# Patient Record
Sex: MALE | Race: WHITE | NOT HISPANIC OR LATINO | Employment: OTHER | URBAN - METROPOLITAN AREA
[De-identification: names, ages, dates, MRNs, and addresses within clinical notes are randomized per-mention and may not be internally consistent; named-entity substitution may affect disease eponyms.]

---

## 2017-03-15 ENCOUNTER — OFFICE VISIT (OUTPATIENT)
Dept: FAMILY MEDICINE CLINIC | Facility: CLINIC | Age: 82
End: 2017-03-15

## 2017-03-15 VITALS
TEMPERATURE: 98.1 F | SYSTOLIC BLOOD PRESSURE: 130 MMHG | OXYGEN SATURATION: 98 % | HEART RATE: 58 BPM | RESPIRATION RATE: 16 BRPM | HEIGHT: 64 IN | WEIGHT: 144 LBS | BODY MASS INDEX: 24.59 KG/M2 | DIASTOLIC BLOOD PRESSURE: 54 MMHG

## 2017-03-15 DIAGNOSIS — H92.01 OTALGIA, RIGHT: Primary | ICD-10-CM

## 2017-03-15 DIAGNOSIS — R58 ECCHYMOSIS: ICD-10-CM

## 2017-03-15 DIAGNOSIS — R05.9 COUGH: ICD-10-CM

## 2017-03-15 DIAGNOSIS — J06.9 ACUTE URI: ICD-10-CM

## 2017-03-15 DIAGNOSIS — D64.9 LOW HEMOGLOBIN: ICD-10-CM

## 2017-03-15 LAB
ERYTHROCYTE [DISTWIDTH] IN BLOOD BY AUTOMATED COUNT: 13 % (ref 4.5–15)
HCT VFR BLD AUTO: 38.5 % (ref 35–60)
HGB BLD-MCNC: 12.6 G/DL (ref 13.5–18)
LYMPHOCYTES # BLD AUTO: 1.5 10*3/MM3 (ref 1.2–3.4)
LYMPHOCYTES NFR BLD AUTO: 18.9 % (ref 21–51)
MCH RBC QN AUTO: 30.5 PG (ref 26.1–33.1)
MCHC RBC AUTO-ENTMCNC: 32.8 G/DL (ref 33–37)
MCV RBC AUTO: 93 FL (ref 80–99)
MONOCYTES # BLD AUTO: 0.5 10*3/MM3 (ref 0.1–0.6)
MONOCYTES NFR BLD AUTO: 7 % (ref 2–9)
NEUTROPHILS # BLD AUTO: 5.7 10*3/MM3 (ref 1.4–6.5)
NEUTROPHILS NFR BLD AUTO: 74.1 % (ref 42–75)
PLATELET # BLD AUTO: 216 10*3/MM3 (ref 150–450)
PMV BLD AUTO: 7.7 FL (ref 7.1–10.5)
RBC # BLD AUTO: 4.13 10*6/MM3 (ref 4–6)
WBC NRBC COR # BLD: 7.7 10*3/MM3 (ref 4.5–10)

## 2017-03-15 PROCEDURE — 71020 XR CHEST 2 VW: CPT | Performed by: NURSE PRACTITIONER

## 2017-03-15 PROCEDURE — 36415 COLL VENOUS BLD VENIPUNCTURE: CPT | Performed by: NURSE PRACTITIONER

## 2017-03-15 PROCEDURE — 99213 OFFICE O/P EST LOW 20 MIN: CPT | Performed by: NURSE PRACTITIONER

## 2017-03-15 PROCEDURE — 85025 COMPLETE CBC W/AUTO DIFF WBC: CPT | Performed by: NURSE PRACTITIONER

## 2017-03-15 RX ORDER — FLUTICASONE PROPIONATE 50 MCG
2 SPRAY, SUSPENSION (ML) NASAL DAILY
Qty: 1 BOTTLE | Refills: 12 | Status: SHIPPED | OUTPATIENT
Start: 2017-03-15 | End: 2018-05-18

## 2017-03-15 RX ORDER — AMOXICILLIN 875 MG/1
875 TABLET, COATED ORAL 2 TIMES DAILY
Qty: 14 TABLET | Refills: 0 | Status: SHIPPED | OUTPATIENT
Start: 2017-03-15 | End: 2017-03-22

## 2017-03-15 NOTE — PROGRESS NOTES
Subjective   Praveen Torres is a 81 y.o. male.     History of Present Illness   C/o cough, congestion, ear drainage, started about 10 days ago, he denies fever, he has right ear pressure and drainage. He denies chills and sweats. He states he got better, then started coughing again. He states he tried nyquil, which usually helps him but it has not yet. He has a slight sore throat. He has a productive cough, grayish sputum. He denies seasonal allergies. He denies sweats and chills. He states he is UTD on his pneumonia vaccine.   He states he has a bruise on his chest, he states when he coughs he is sore and had pain, he states the bruise is yellow, he noticed it about 3 days ago. He states it is painful when he coughs. He denies and fall or injury.    The following portions of the patient's history were reviewed and updated as appropriate: allergies, current medications, past family history, past medical history, past social history, past surgical history and problem list.    Review of Systems   Constitutional: Negative for chills and fever.   HENT: Positive for congestion, ear discharge (drainage) and ear pain. Negative for postnasal drip, rhinorrhea and sore throat.    Respiratory: Positive for cough. Negative for chest tightness, shortness of breath and wheezing.    Cardiovascular: Negative for chest pain.   Musculoskeletal: Positive for myalgias. Negative for arthralgias.   Skin: Positive for color change. Negative for rash.   Allergic/Immunologic: Positive for environmental allergies.   Neurological: Negative for dizziness, light-headedness and headaches.       Objective   Physical Exam   Constitutional: He is oriented to person, place, and time. He appears well-developed and well-nourished.   HENT:   Head: Normocephalic.   Right Ear: Ear canal normal. A middle ear effusion is present.   Left Ear: Tympanic membrane and ear canal normal.   Nose: Nose normal.   Mouth/Throat: Uvula is midline, oropharynx is clear  and moist and mucous membranes are normal.   Eyes: Pupils are equal, round, and reactive to light.   Neck: Neck supple.   Cardiovascular: Normal rate, regular rhythm and normal heart sounds.    Pulmonary/Chest: Effort normal and breath sounds normal. No respiratory distress. He has no decreased breath sounds. He has no wheezes. He has no rhonchi. He exhibits tenderness. He exhibits no laceration, no edema, no deformity and no swelling.       Musculoskeletal: He exhibits tenderness.   Lymphadenopathy:     He has no cervical adenopathy.   Neurological: He is alert and oriented to person, place, and time.   Skin: Skin is warm and dry.   Psychiatric: He has a normal mood and affect. His behavior is normal. Judgment and thought content normal.   Nursing note and vitals reviewed.    cxr 2v in office, for cough and bruising on sternum, no comparison noted, shows NAD, awaiting radiology over read.     Assessment/Plan   Praveen was seen today for cough.    Diagnoses and all orders for this visit:    Otalgia, right    Acute URI    Cough  -     XR Chest 2 View  -     CBC & Differential  -     CBC Auto Differential    Ecchymosis  -     CBC & Differential  -     CBC Auto Differential    Low hemoglobin  -     Occult Blood X 1, Stool    Other orders  -     amoxicillin (AMOXIL) 875 MG tablet; Take 1 tablet by mouth 2 (Two) Times a Day for 7 days.  -     fluticasone (FLONASE) 50 MCG/ACT nasal spray; 2 sprays into each nostril Daily.        CBC today, low hgb 12.7. Platelets WNL.   Occult stool x1, patient to return to office.  CXR today, will call with results.  Start amoxicillin 875mg 2x day for 7 days, take with food.  Start flonase 2 sprays each nostril daily.   May try mucinex OTC as needed for cough.   Increase fluid intake, get plenty of rest.   Patient agrees with plan of care and understands instructions. Call if worsening symptoms or any problems or concerns.

## 2017-03-15 NOTE — PATIENT INSTRUCTIONS
CBC today, low hgb 12.7. Platelets WNL.   Occult stool x1, patient to return to office.  CXR today, will call with results.  Start amoxicillin 875mg 2x day for 7 days, take with food.  Start flonase 2 sprays each nostril daily.   May try mucinex OTC as needed for cough.   Increase fluid intake, get plenty of rest.   Patient agrees with plan of care and understands instructions. Call if worsening symptoms or any problems or concerns.

## 2017-03-16 ENCOUNTER — TELEPHONE (OUTPATIENT)
Dept: FAMILY MEDICINE CLINIC | Facility: CLINIC | Age: 82
End: 2017-03-16

## 2017-03-16 DIAGNOSIS — R58 ECCHYMOSIS: Primary | ICD-10-CM

## 2017-03-17 ENCOUNTER — TELEPHONE (OUTPATIENT)
Dept: FAMILY MEDICINE CLINIC | Facility: CLINIC | Age: 82
End: 2017-03-17

## 2017-03-17 LAB — GASTROCULT GAST QL: NEGATIVE

## 2017-03-17 PROCEDURE — 82274 ASSAY TEST FOR BLOOD FECAL: CPT | Performed by: NURSE PRACTITIONER

## 2017-03-17 NOTE — TELEPHONE ENCOUNTER
----- Message from ROXANNE Maldonado sent at 3/17/2017 10:30 AM EDT -----  Please call patient with results.  His occult blood was negative. Increase fluids, may try MV OTC.    Pt informed of lab results

## 2017-03-20 ENCOUNTER — CLINICAL SUPPORT (OUTPATIENT)
Dept: FAMILY MEDICINE CLINIC | Facility: CLINIC | Age: 82
End: 2017-03-20

## 2017-03-20 DIAGNOSIS — R05.9 COUGH: Primary | ICD-10-CM

## 2017-03-20 PROCEDURE — 71110 X-RAY EXAM RIBS BIL 3 VIEWS: CPT | Performed by: NURSE PRACTITIONER

## 2017-03-22 ENCOUNTER — TELEPHONE (OUTPATIENT)
Dept: FAMILY MEDICINE CLINIC | Facility: CLINIC | Age: 82
End: 2017-03-22

## 2017-03-22 NOTE — TELEPHONE ENCOUNTER
----- Message from ROXANNE Maldonado sent at 3/22/2017  8:42 AM EDT -----  Please call patient with results.  His rib xray is normal, no fractures, heart is normal size, he should f/u as needed or if bruising does not resolve.    Pt informed of xray results

## 2017-03-23 NOTE — PROGRESS NOTES
bilat rib 3v xray in office, no comparison, for chest bruising and cough, shows NAD, no fracture per radiologist report.

## 2017-04-10 RX ORDER — SIMVASTATIN 10 MG
TABLET ORAL
Qty: 90 TABLET | Refills: 2 | Status: SHIPPED | OUTPATIENT
Start: 2017-04-10 | End: 2018-01-18 | Stop reason: SDUPTHER

## 2017-04-10 RX ORDER — LISINOPRIL 10 MG/1
TABLET ORAL
Qty: 90 TABLET | Refills: 2 | Status: SHIPPED | OUTPATIENT
Start: 2017-04-10 | End: 2018-01-18 | Stop reason: SDUPTHER

## 2017-04-20 RX ORDER — CYANOCOBALAMIN 1000 UG/ML
INJECTION, SOLUTION INTRAMUSCULAR; SUBCUTANEOUS
Qty: 3 ML | Refills: 2 | Status: SHIPPED | OUTPATIENT
Start: 2017-04-20 | End: 2018-01-18 | Stop reason: SDUPTHER

## 2017-04-27 ENCOUNTER — LAB (OUTPATIENT)
Dept: FAMILY MEDICINE CLINIC | Facility: CLINIC | Age: 82
End: 2017-04-27

## 2017-04-27 DIAGNOSIS — E78.5 HYPERLIPIDEMIA, UNSPECIFIED HYPERLIPIDEMIA TYPE: Primary | ICD-10-CM

## 2017-04-27 LAB
ALBUMIN SERPL-MCNC: 4.4 G/DL (ref 3.5–5.2)
ALBUMIN/GLOB SERPL: 1.5 G/DL
ALP SERPL-CCNC: 66 U/L (ref 39–117)
ALT SERPL W P-5'-P-CCNC: 18 U/L (ref 1–41)
ANION GAP SERPL CALCULATED.3IONS-SCNC: 9.8 MMOL/L
AST SERPL-CCNC: 23 U/L (ref 1–40)
BILIRUB SERPL-MCNC: 0.6 MG/DL (ref 0.1–1.2)
BUN BLD-MCNC: 13 MG/DL (ref 8–23)
BUN/CREAT SERPL: 10.7 (ref 7–25)
CALCIUM SPEC-SCNC: 9.7 MG/DL (ref 8.6–10.5)
CHLORIDE SERPL-SCNC: 99 MMOL/L (ref 98–107)
CHOLEST SERPL-MCNC: 154 MG/DL (ref 0–200)
CO2 SERPL-SCNC: 31.2 MMOL/L (ref 22–29)
CREAT BLD-MCNC: 1.22 MG/DL (ref 0.76–1.27)
GFR SERPL CREATININE-BSD FRML MDRD: 57 ML/MIN/1.73
GLOBULIN UR ELPH-MCNC: 2.9 GM/DL
GLUCOSE BLD-MCNC: 92 MG/DL (ref 65–99)
HDLC SERPL-MCNC: 61 MG/DL (ref 40–60)
LDLC SERPL CALC-MCNC: 78 MG/DL (ref 0–100)
LDLC/HDLC SERPL: 1.28 {RATIO}
POTASSIUM BLD-SCNC: 4.7 MMOL/L (ref 3.5–5.2)
PROT SERPL-MCNC: 7.3 G/DL (ref 6–8.5)
SODIUM BLD-SCNC: 140 MMOL/L (ref 136–145)
TRIGL SERPL-MCNC: 76 MG/DL (ref 0–150)
VLDLC SERPL-MCNC: 15.2 MG/DL (ref 5–40)

## 2017-04-27 PROCEDURE — 80061 LIPID PANEL: CPT | Performed by: INTERNAL MEDICINE

## 2017-04-27 PROCEDURE — 80053 COMPREHEN METABOLIC PANEL: CPT | Performed by: INTERNAL MEDICINE

## 2017-04-27 RX ORDER — ESCITALOPRAM OXALATE 10 MG/1
TABLET ORAL
Qty: 90 TABLET | Refills: 2 | Status: SHIPPED | OUTPATIENT
Start: 2017-04-27 | End: 2018-01-25 | Stop reason: SDUPTHER

## 2017-05-04 ENCOUNTER — OFFICE VISIT (OUTPATIENT)
Dept: FAMILY MEDICINE CLINIC | Facility: CLINIC | Age: 82
End: 2017-05-04

## 2017-05-04 VITALS
HEART RATE: 65 BPM | SYSTOLIC BLOOD PRESSURE: 138 MMHG | TEMPERATURE: 97.7 F | OXYGEN SATURATION: 97 % | WEIGHT: 149.6 LBS | HEIGHT: 64 IN | BODY MASS INDEX: 25.54 KG/M2 | DIASTOLIC BLOOD PRESSURE: 58 MMHG

## 2017-05-04 DIAGNOSIS — R79.89 ABNORMAL CBC: Primary | ICD-10-CM

## 2017-05-04 DIAGNOSIS — Z00.00 MEDICARE ANNUAL WELLNESS VISIT, INITIAL: Primary | ICD-10-CM

## 2017-05-04 DIAGNOSIS — E78.49 OTHER HYPERLIPIDEMIA: ICD-10-CM

## 2017-05-04 DIAGNOSIS — E53.8 B12 DEFICIENCY: ICD-10-CM

## 2017-05-04 DIAGNOSIS — I10 HYPERTENSION, ESSENTIAL: ICD-10-CM

## 2017-05-04 DIAGNOSIS — D51.8 OTHER VITAMIN B12 DEFICIENCY ANEMIA: ICD-10-CM

## 2017-05-04 DIAGNOSIS — G47.00 INSOMNIA, UNSPECIFIED TYPE: ICD-10-CM

## 2017-05-04 DIAGNOSIS — J30.1 SEASONAL ALLERGIC RHINITIS DUE TO POLLEN: ICD-10-CM

## 2017-05-04 LAB
ERYTHROCYTE [DISTWIDTH] IN BLOOD BY AUTOMATED COUNT: 13.4 % (ref 4.5–15)
FERRITIN SERPL-MCNC: 64.95 NG/ML (ref 30–400)
FOLATE SERPL-MCNC: >20 NG/ML (ref 4.78–24.2)
HCT VFR BLD AUTO: 38.8 % (ref 35–60)
HGB BLD-MCNC: 13.1 G/DL (ref 13.5–18)
IRON 24H UR-MRATE: 73 MCG/DL (ref 59–158)
IRON SATN MFR SERPL: 20 % (ref 20–50)
LYMPHOCYTES # BLD AUTO: 1.7 10*3/MM3 (ref 1.2–3.4)
LYMPHOCYTES NFR BLD AUTO: 26.1 % (ref 21–51)
MCH RBC QN AUTO: 31.5 PG (ref 26.1–33.1)
MCHC RBC AUTO-ENTMCNC: 33.7 G/DL (ref 33–37)
MCV RBC AUTO: 93.5 FL (ref 80–99)
MONOCYTES # BLD AUTO: 0.4 10*3/MM3 (ref 0.1–0.6)
MONOCYTES NFR BLD AUTO: 6.4 % (ref 2–9)
NEUTROPHILS # BLD AUTO: 4.3 10*3/MM3 (ref 1.4–6.5)
NEUTROPHILS NFR BLD AUTO: 67.5 % (ref 42–75)
PLATELET # BLD AUTO: 141 10*3/MM3 (ref 150–450)
PMV BLD AUTO: 7.7 FL (ref 7.1–10.5)
RBC # BLD AUTO: 4.15 10*6/MM3 (ref 4–6)
TIBC SERPL-MCNC: 364 MCG/DL (ref 298–536)
TRANSFERRIN SERPL-MCNC: 244 MG/DL (ref 200–360)
VIT B12 BLD-MCNC: 1034 PG/ML (ref 211–946)
WBC NRBC COR # BLD: 6.4 10*3/MM3 (ref 4.5–10)

## 2017-05-04 PROCEDURE — 90670 PCV13 VACCINE IM: CPT | Performed by: INTERNAL MEDICINE

## 2017-05-04 PROCEDURE — 82728 ASSAY OF FERRITIN: CPT | Performed by: INTERNAL MEDICINE

## 2017-05-04 PROCEDURE — 84466 ASSAY OF TRANSFERRIN: CPT | Performed by: INTERNAL MEDICINE

## 2017-05-04 PROCEDURE — 82607 VITAMIN B-12: CPT | Performed by: INTERNAL MEDICINE

## 2017-05-04 PROCEDURE — 99213 OFFICE O/P EST LOW 20 MIN: CPT | Performed by: INTERNAL MEDICINE

## 2017-05-04 PROCEDURE — 85025 COMPLETE CBC W/AUTO DIFF WBC: CPT | Performed by: INTERNAL MEDICINE

## 2017-05-04 PROCEDURE — G0439 PPPS, SUBSEQ VISIT: HCPCS | Performed by: INTERNAL MEDICINE

## 2017-05-04 PROCEDURE — G0009 ADMIN PNEUMOCOCCAL VACCINE: HCPCS | Performed by: INTERNAL MEDICINE

## 2017-05-04 PROCEDURE — 83540 ASSAY OF IRON: CPT | Performed by: INTERNAL MEDICINE

## 2017-05-04 PROCEDURE — 82746 ASSAY OF FOLIC ACID SERUM: CPT | Performed by: INTERNAL MEDICINE

## 2017-05-04 RX ORDER — TRAZODONE HYDROCHLORIDE 50 MG/1
25 TABLET ORAL NIGHTLY
Qty: 90 TABLET | Refills: 3 | Status: SHIPPED | OUTPATIENT
Start: 2017-05-04 | End: 2018-05-18 | Stop reason: SDUPTHER

## 2017-07-29 RX ORDER — TRAZODONE HYDROCHLORIDE 50 MG/1
TABLET ORAL
Qty: 90 TABLET | Refills: 2 | Status: SHIPPED | OUTPATIENT
Start: 2017-07-29 | End: 2018-05-18 | Stop reason: SDUPTHER

## 2018-01-19 RX ORDER — CYANOCOBALAMIN 1000 UG/ML
INJECTION, SOLUTION INTRAMUSCULAR; SUBCUTANEOUS
Qty: 3 ML | Refills: 2 | Status: SHIPPED | OUTPATIENT
Start: 2018-01-19 | End: 2018-05-18 | Stop reason: SDUPTHER

## 2018-01-19 RX ORDER — SIMVASTATIN 10 MG
TABLET ORAL
Qty: 90 TABLET | Refills: 2 | Status: SHIPPED | OUTPATIENT
Start: 2018-01-19 | End: 2018-05-18 | Stop reason: SDUPTHER

## 2018-01-19 RX ORDER — LISINOPRIL 10 MG/1
TABLET ORAL
Qty: 90 TABLET | Refills: 2 | Status: SHIPPED | OUTPATIENT
Start: 2018-01-19 | End: 2018-05-18 | Stop reason: SDUPTHER

## 2018-01-25 RX ORDER — ESCITALOPRAM OXALATE 10 MG/1
TABLET ORAL
Qty: 90 TABLET | Refills: 2 | Status: SHIPPED | OUTPATIENT
Start: 2018-01-25 | End: 2018-05-18 | Stop reason: SDUPTHER

## 2018-05-18 ENCOUNTER — OFFICE VISIT (OUTPATIENT)
Dept: INTERNAL MEDICINE | Facility: CLINIC | Age: 83
End: 2018-05-18

## 2018-05-18 VITALS
OXYGEN SATURATION: 99 % | HEIGHT: 64 IN | HEART RATE: 57 BPM | WEIGHT: 147.2 LBS | RESPIRATION RATE: 16 BRPM | BODY MASS INDEX: 25.13 KG/M2 | SYSTOLIC BLOOD PRESSURE: 129 MMHG | DIASTOLIC BLOOD PRESSURE: 78 MMHG

## 2018-05-18 DIAGNOSIS — E78.2 MIXED HYPERLIPIDEMIA: Primary | ICD-10-CM

## 2018-05-18 DIAGNOSIS — F51.01 PRIMARY INSOMNIA: ICD-10-CM

## 2018-05-18 DIAGNOSIS — D51.8 OTHER VITAMIN B12 DEFICIENCY ANEMIA: ICD-10-CM

## 2018-05-18 DIAGNOSIS — B00.9 HSV INFECTION: ICD-10-CM

## 2018-05-18 DIAGNOSIS — I10 ESSENTIAL HYPERTENSION: ICD-10-CM

## 2018-05-18 DIAGNOSIS — F41.9 ANXIETY: ICD-10-CM

## 2018-05-18 PROBLEM — D51.9 ANEMIA DUE TO VITAMIN B12 DEFICIENCY: Status: ACTIVE | Noted: 2018-05-18

## 2018-05-18 PROBLEM — E78.5 HYPERLIPIDEMIA: Status: ACTIVE | Noted: 2018-05-18

## 2018-05-18 PROCEDURE — 99214 OFFICE O/P EST MOD 30 MIN: CPT | Performed by: FAMILY MEDICINE

## 2018-05-18 RX ORDER — LISINOPRIL 10 MG/1
10 TABLET ORAL DAILY
Qty: 90 TABLET | Refills: 2 | Status: SHIPPED | OUTPATIENT
Start: 2018-05-18 | End: 2019-04-12 | Stop reason: SDUPTHER

## 2018-05-18 RX ORDER — SIMVASTATIN 10 MG
10 TABLET ORAL DAILY
Qty: 90 TABLET | Refills: 2 | Status: SHIPPED | OUTPATIENT
Start: 2018-05-18 | End: 2019-04-12 | Stop reason: SDUPTHER

## 2018-05-18 RX ORDER — CYANOCOBALAMIN 1000 UG/ML
1000 INJECTION, SOLUTION INTRAMUSCULAR; SUBCUTANEOUS
Qty: 3 ML | Refills: 2 | Status: SHIPPED | OUTPATIENT
Start: 2018-05-18 | End: 2019-03-11 | Stop reason: SDUPTHER

## 2018-05-18 RX ORDER — TRAZODONE HYDROCHLORIDE 50 MG/1
25 TABLET ORAL NIGHTLY
Qty: 90 TABLET | Refills: 3 | Status: SHIPPED | OUTPATIENT
Start: 2018-05-18 | End: 2019-05-20 | Stop reason: SDUPTHER

## 2018-05-18 RX ORDER — ESCITALOPRAM OXALATE 10 MG/1
10 TABLET ORAL DAILY
Qty: 90 TABLET | Refills: 2 | Status: SHIPPED | OUTPATIENT
Start: 2018-05-18 | End: 2019-05-11 | Stop reason: SDUPTHER

## 2018-05-18 NOTE — PROGRESS NOTES
Praveen Torres is a 83 y.o. male.      Assessment/Plan   Problem List Items Addressed This Visit        Cardiovascular and Mediastinum    Mixed hyperlipidemia - Primary    Relevant Medications    simvastatin (ZOCOR) 10 MG tablet    Other Relevant Orders    Lipid Panel    Essential hypertension    Relevant Medications    lisinopril (PRINIVIL,ZESTRIL) 10 MG tablet    Other Relevant Orders    Comprehensive Metabolic Panel       Digestive    Anemia due to vitamin B12 deficiency    Relevant Medications    cyanocobalamin 1000 MCG/ML injection    Other Relevant Orders    CBC & Differential    Methylmalonic Acid, Serum       Other    HSV infection    Anxiety    Primary insomnia          Recommend shingles vaccine through pharmacy  Follow  up results of blood work continue present management of chronic medical problems  Otherwise as needed or in 9 months    Chief Complaint   Patient presents with   • esatblish care for hypertension   • Establish Care     for cholesterol    • Establish Care     for anxiety     Social History   Substance Use Topics   • Smoking status: Never Smoker   • Smokeless tobacco: Never Used   • Alcohol use Not on file       History of Present Illness   Follow-up visit for chronic problems of hypertension hyperlipidemia vitamin D deficiency anxiety insomnia and intermittent HSV infection he says everything is well controlled with present treatment plan he will continue the same he has been 1 side effects of chest pain shortness breath increased fatigue  The following portions of the patient's history were reviewed and updated as appropriate:PMHroutine: Social history , Past Medical History, Surgical history , Allergies, Current Medications, Active Problem List, Family History and Health Maintenance    Review of Systems   Constitutional: Negative for activity change, appetite change and unexpected weight change.   HENT: Negative for nosebleeds and trouble swallowing.    Eyes: Negative for pain and visual  "disturbance.   Respiratory: Negative for chest tightness, shortness of breath and wheezing.    Cardiovascular: Negative for chest pain and palpitations.   Gastrointestinal: Negative for abdominal pain and blood in stool.   Endocrine: Negative.    Genitourinary: Negative for difficulty urinating and hematuria.   Musculoskeletal: Negative for joint swelling.   Skin: Negative for color change and rash.   Allergic/Immunologic: Negative.    Neurological: Negative for syncope and speech difficulty.   Hematological: Negative.  Negative for adenopathy.   Psychiatric/Behavioral: Negative for agitation and confusion.   All other systems reviewed and are negative.      Objective   Vitals:    05/18/18 1359   BP: 129/78   BP Location: Right arm   Patient Position: Sitting   Cuff Size: Adult   Pulse: 57   Resp: 16   SpO2: 99%   Weight: 66.8 kg (147 lb 3.2 oz)   Height: 162.6 cm (64\")     Body mass index is 25.27 kg/m².  Physical Exam   Constitutional: He is oriented to person, place, and time. He appears well-developed and well-nourished. No distress.   HENT:   Head: Normocephalic and atraumatic.   Eyes: Conjunctivae and EOM are normal. Pupils are equal, round, and reactive to light. Right eye exhibits no discharge. Left eye exhibits no discharge. No scleral icterus.   Neck: Normal range of motion. Neck supple. No tracheal deviation present. No thyromegaly present.   Cardiovascular: Normal rate, regular rhythm, normal heart sounds, intact distal pulses and normal pulses.  Exam reveals no gallop.    No murmur heard.  Pulmonary/Chest: Effort normal and breath sounds normal. No respiratory distress. He has no wheezes. He has no rales.   Abdominal: Soft. Bowel sounds are normal.   Musculoskeletal: Normal range of motion.   Neurological: He is alert and oriented to person, place, and time. He exhibits normal muscle tone. Coordination normal.   Skin: Skin is warm. No rash noted. No erythema. No pallor.   Psychiatric: He has a normal " mood and affect. His behavior is normal. Judgment and thought content normal.   Nursing note and vitals reviewed.    Reviewed Data:  No visits with results within 1 Month(s) from this visit.   Latest known visit with results is:   Office Visit on 05/04/2017   Component Date Value Ref Range Status   • Ferritin 05/04/2017 64.95  30.00 - 400.00 ng/mL Final   • Iron 05/04/2017 73  59 - 158 mcg/dL Final   • Iron Saturation 05/04/2017 20  20 - 50 % Final   • Transferrin 05/04/2017 244  200 - 360 mg/dL Final   • TIBC 05/04/2017 364  298 - 536 mcg/dL Final   • Folate 05/04/2017 >20.00  4.78 - 24.20 ng/mL Final   • Vitamin B-12 05/04/2017 1034* 211 - 946 pg/mL Final   • WBC 05/04/2017 6.40  4.50 - 10.00 10*3/mm3 Final   • RBC 05/04/2017 4.15  4.00 - 6.00 10*6/mm3 Final   • Hemoglobin 05/04/2017 13.1* 13.5 - 18.0 g/dL Final   • Hematocrit 05/04/2017 38.8  35.0 - 60.0 % Final   • RDW 05/04/2017 13.4  4.5 - 15.0 % Final   • MCV 05/04/2017 93.5  80.0 - 99.0 fL Final   • MCH 05/04/2017 31.5  26.1 - 33.1 pg Final   • MCHC 05/04/2017 33.7  33.0 - 37.0 g/dL Final   • MPV 05/04/2017 7.7  7.1 - 10.5 fL Final   • Platelets 05/04/2017 141* 150 - 450 10*3/mm3 Final   • Neutrophil % 05/04/2017 67.5  42.0 - 75.0 % Final   • Lymphocyte % 05/04/2017 26.1  21.0 - 51.0 % Final   • Monocyte % 05/04/2017 6.4  2.0 - 9.0 % Final   • Neutrophils, Absolute 05/04/2017 4.30  1.40 - 6.50 10*3/mm3 Final   • Lymphocytes, Absolute 05/04/2017 1.70  1.20 - 3.40 10*3/mm3 Final   • Monocytes, Absolute 05/04/2017 0.40  0.10 - 0.60 10*3/mm3 Final

## 2018-06-04 ENCOUNTER — TELEPHONE (OUTPATIENT)
Dept: INTERNAL MEDICINE | Facility: CLINIC | Age: 83
End: 2018-06-04

## 2018-06-04 LAB
ALBUMIN SERPL-MCNC: 4.3 G/DL (ref 3.5–5.2)
ALBUMIN/GLOB SERPL: 2 G/DL
ALP SERPL-CCNC: 66 U/L (ref 39–117)
ALT SERPL-CCNC: 18 U/L (ref 1–41)
AST SERPL-CCNC: 17 U/L (ref 1–40)
BASOPHILS # BLD AUTO: 0.02 10*3/MM3 (ref 0–0.2)
BASOPHILS NFR BLD AUTO: 0.4 % (ref 0–1.5)
BILIRUB SERPL-MCNC: 0.5 MG/DL (ref 0.1–1.2)
BUN SERPL-MCNC: 14 MG/DL (ref 8–23)
BUN/CREAT SERPL: 10.3 (ref 7–25)
CALCIUM SERPL-MCNC: 9.3 MG/DL (ref 8.6–10.5)
CHLORIDE SERPL-SCNC: 103 MMOL/L (ref 98–107)
CHOLEST SERPL-MCNC: 153 MG/DL (ref 0–200)
CO2 SERPL-SCNC: 30.7 MMOL/L (ref 22–29)
CREAT SERPL-MCNC: 1.36 MG/DL (ref 0.76–1.27)
EOSINOPHIL # BLD AUTO: 0.13 10*3/MM3 (ref 0–0.7)
EOSINOPHIL NFR BLD AUTO: 2.6 % (ref 0.3–6.2)
ERYTHROCYTE [DISTWIDTH] IN BLOOD BY AUTOMATED COUNT: 13.3 % (ref 11.5–14.5)
GFR SERPLBLD CREATININE-BSD FMLA CKD-EPI: 50 ML/MIN/1.73
GFR SERPLBLD CREATININE-BSD FMLA CKD-EPI: 61 ML/MIN/1.73
GLOBULIN SER CALC-MCNC: 2.2 GM/DL
GLUCOSE SERPL-MCNC: 96 MG/DL (ref 65–99)
HCT VFR BLD AUTO: 38.7 % (ref 40.4–52.2)
HDLC SERPL-MCNC: 57 MG/DL (ref 40–60)
HGB BLD-MCNC: 12.6 G/DL (ref 13.7–17.6)
IMM GRANULOCYTES # BLD: 0.01 10*3/MM3 (ref 0–0.03)
IMM GRANULOCYTES NFR BLD: 0.2 % (ref 0–0.5)
LDLC SERPL CALC-MCNC: 80 MG/DL (ref 0–100)
LYMPHOCYTES # BLD AUTO: 1.79 10*3/MM3 (ref 0.9–4.8)
LYMPHOCYTES NFR BLD AUTO: 36.2 % (ref 19.6–45.3)
MCH RBC QN AUTO: 31.2 PG (ref 27–32.7)
MCHC RBC AUTO-ENTMCNC: 32.6 G/DL (ref 32.6–36.4)
MCV RBC AUTO: 95.8 FL (ref 79.8–96.2)
METHYLMALONATE SERPL-SCNC: 143 NMOL/L (ref 0–378)
MONOCYTES # BLD AUTO: 0.54 10*3/MM3 (ref 0.2–1.2)
MONOCYTES NFR BLD AUTO: 10.9 % (ref 5–12)
NEUTROPHILS # BLD AUTO: 2.46 10*3/MM3 (ref 1.9–8.1)
NEUTROPHILS NFR BLD AUTO: 49.9 % (ref 42.7–76)
PLATELET # BLD AUTO: 143 10*3/MM3 (ref 140–500)
POTASSIUM SERPL-SCNC: 5.1 MMOL/L (ref 3.5–5.2)
PROT SERPL-MCNC: 6.5 G/DL (ref 6–8.5)
RBC # BLD AUTO: 4.04 10*6/MM3 (ref 4.6–6)
SODIUM SERPL-SCNC: 145 MMOL/L (ref 136–145)
TRIGL SERPL-MCNC: 80 MG/DL (ref 0–150)
VLDLC SERPL CALC-MCNC: 16 MG/DL (ref 5–40)
WBC # BLD AUTO: 4.94 10*3/MM3 (ref 4.5–10.7)

## 2018-06-04 NOTE — TELEPHONE ENCOUNTER
----- Message from Asa Grace MD sent at 6/4/2018  2:08 PM EDT -----  Labs ok recheck chemistry panel 6 months

## 2018-11-20 DIAGNOSIS — I10 ESSENTIAL HYPERTENSION: ICD-10-CM

## 2018-11-20 DIAGNOSIS — I10 ESSENTIAL HYPERTENSION: Primary | ICD-10-CM

## 2018-11-20 LAB
ALBUMIN SERPL-MCNC: 4.6 G/DL (ref 3.5–5.2)
ALBUMIN/GLOB SERPL: 1.9 G/DL
ALP SERPL-CCNC: 69 U/L (ref 39–117)
ALT SERPL-CCNC: 18 U/L (ref 1–41)
AST SERPL-CCNC: 19 U/L (ref 1–40)
BILIRUB SERPL-MCNC: 0.5 MG/DL (ref 0.1–1.2)
BUN SERPL-MCNC: 13 MG/DL (ref 8–23)
BUN/CREAT SERPL: 10.8 (ref 7–25)
CALCIUM SERPL-MCNC: 9.5 MG/DL (ref 8.6–10.5)
CHLORIDE SERPL-SCNC: 99 MMOL/L (ref 98–107)
CO2 SERPL-SCNC: 32.5 MMOL/L (ref 22–29)
CREAT SERPL-MCNC: 1.2 MG/DL (ref 0.76–1.27)
GLOBULIN SER CALC-MCNC: 2.4 GM/DL
GLUCOSE SERPL-MCNC: 106 MG/DL (ref 65–99)
POTASSIUM SERPL-SCNC: 4.8 MMOL/L (ref 3.5–5.2)
PROT SERPL-MCNC: 7 G/DL (ref 6–8.5)
SODIUM SERPL-SCNC: 140 MMOL/L (ref 136–145)

## 2018-11-28 ENCOUNTER — TELEPHONE (OUTPATIENT)
Dept: INTERNAL MEDICINE | Facility: CLINIC | Age: 83
End: 2018-11-28

## 2019-03-11 RX ORDER — CYANOCOBALAMIN 1000 UG/ML
INJECTION, SOLUTION INTRAMUSCULAR; SUBCUTANEOUS
Qty: 3 ML | Refills: 2 | Status: SHIPPED | OUTPATIENT
Start: 2019-03-11 | End: 2019-11-18 | Stop reason: SDUPTHER

## 2019-04-12 RX ORDER — SIMVASTATIN 10 MG
TABLET ORAL
Qty: 30 TABLET | Refills: 0 | Status: SHIPPED | OUTPATIENT
Start: 2019-04-12 | End: 2019-05-20 | Stop reason: SDUPTHER

## 2019-04-12 RX ORDER — LISINOPRIL 10 MG/1
TABLET ORAL
Qty: 30 TABLET | Refills: 0 | Status: SHIPPED | OUTPATIENT
Start: 2019-04-12 | End: 2019-05-20 | Stop reason: SDUPTHER

## 2019-05-11 RX ORDER — ESCITALOPRAM OXALATE 10 MG/1
TABLET ORAL
Qty: 30 TABLET | Refills: 0 | Status: SHIPPED | OUTPATIENT
Start: 2019-05-11 | End: 2019-05-20 | Stop reason: SDUPTHER

## 2019-05-20 ENCOUNTER — OFFICE VISIT (OUTPATIENT)
Dept: INTERNAL MEDICINE | Facility: CLINIC | Age: 84
End: 2019-05-20

## 2019-05-20 VITALS
TEMPERATURE: 98.5 F | DIASTOLIC BLOOD PRESSURE: 62 MMHG | WEIGHT: 153 LBS | SYSTOLIC BLOOD PRESSURE: 152 MMHG | OXYGEN SATURATION: 97 % | BODY MASS INDEX: 26.26 KG/M2 | HEART RATE: 51 BPM

## 2019-05-20 DIAGNOSIS — F41.9 ANXIETY: ICD-10-CM

## 2019-05-20 DIAGNOSIS — E78.2 MIXED HYPERLIPIDEMIA: ICD-10-CM

## 2019-05-20 DIAGNOSIS — I10 ESSENTIAL HYPERTENSION: Primary | ICD-10-CM

## 2019-05-20 LAB
ALBUMIN SERPL-MCNC: 4.5 G/DL (ref 3.5–5.2)
ALBUMIN/GLOB SERPL: 2.1 G/DL
ALP SERPL-CCNC: 73 U/L (ref 39–117)
ALT SERPL-CCNC: 15 U/L (ref 1–41)
AST SERPL-CCNC: 19 U/L (ref 1–40)
BILIRUB SERPL-MCNC: 0.4 MG/DL (ref 0.2–1.2)
BUN SERPL-MCNC: 14 MG/DL (ref 8–23)
BUN/CREAT SERPL: 10.4 (ref 7–25)
CALCIUM SERPL-MCNC: 9.9 MG/DL (ref 8.6–10.5)
CHLORIDE SERPL-SCNC: 101 MMOL/L (ref 98–107)
CHOLEST SERPL-MCNC: 160 MG/DL (ref 0–200)
CO2 SERPL-SCNC: 31.2 MMOL/L (ref 22–29)
CREAT SERPL-MCNC: 1.34 MG/DL (ref 0.76–1.27)
GLOBULIN SER CALC-MCNC: 2.1 GM/DL
GLUCOSE SERPL-MCNC: 88 MG/DL (ref 65–99)
HDLC SERPL-MCNC: 61 MG/DL (ref 40–60)
LDLC SERPL CALC-MCNC: 80 MG/DL (ref 0–100)
POTASSIUM SERPL-SCNC: 5 MMOL/L (ref 3.5–5.2)
PROT SERPL-MCNC: 6.6 G/DL (ref 6–8.5)
SODIUM SERPL-SCNC: 141 MMOL/L (ref 136–145)
TRIGL SERPL-MCNC: 97 MG/DL (ref 0–150)
VLDLC SERPL CALC-MCNC: 19.4 MG/DL

## 2019-05-20 PROCEDURE — 99214 OFFICE O/P EST MOD 30 MIN: CPT | Performed by: FAMILY MEDICINE

## 2019-05-20 PROCEDURE — G0439 PPPS, SUBSEQ VISIT: HCPCS | Performed by: FAMILY MEDICINE

## 2019-05-20 RX ORDER — SIMVASTATIN 10 MG
10 TABLET ORAL DAILY
Qty: 90 TABLET | Refills: 2 | Status: SHIPPED | OUTPATIENT
Start: 2019-05-20 | End: 2019-11-21 | Stop reason: SDUPTHER

## 2019-05-20 RX ORDER — LISINOPRIL 10 MG/1
10 TABLET ORAL DAILY
Qty: 90 TABLET | Refills: 2 | Status: SHIPPED | OUTPATIENT
Start: 2019-05-20 | End: 2019-11-21 | Stop reason: SDUPTHER

## 2019-05-20 RX ORDER — TRAZODONE HYDROCHLORIDE 50 MG/1
25 TABLET ORAL NIGHTLY
Qty: 45 TABLET | Refills: 2 | Status: SHIPPED | OUTPATIENT
Start: 2019-05-20 | End: 2019-05-21 | Stop reason: SDUPTHER

## 2019-05-20 RX ORDER — ESCITALOPRAM OXALATE 10 MG/1
10 TABLET ORAL DAILY
Qty: 90 TABLET | Refills: 2 | Status: SHIPPED | OUTPATIENT
Start: 2019-05-20 | End: 2019-11-21 | Stop reason: SDUPTHER

## 2019-05-20 NOTE — PROGRESS NOTES
Praveen Torres is a 84 y.o. male.      Assessment/Plan   Problem List Items Addressed This Visit        Cardiovascular and Mediastinum    Mixed hyperlipidemia    Relevant Medications    simvastatin (ZOCOR) 10 MG tablet    Other Relevant Orders    Comprehensive Metabolic Panel    Lipid Panel    Essential hypertension - Primary    Relevant Medications    lisinopril (PRINIVIL,ZESTRIL) 10 MG tablet    Other Relevant Orders    Comprehensive Metabolic Panel    Lipid Panel       Other    Anxiety             Return in about 6 months (around 11/20/2019), or if symptoms worsen or fail to improve, for Recheck, Next scheduled follow up.      Chief Complaint   Patient presents with   • follow up to anxiety   • follow up to hypertension   • Medicare Wellness-subsequent     Social History     Tobacco Use   • Smoking status: Never Smoker   • Smokeless tobacco: Never Used   Substance Use Topics   • Alcohol use: Not on file   • Drug use: Not on file       History of Present Illness   Patient follow-up appoint for chronic problems of hypertension hyperlipidemia anxiety doing well with no acute concerns no unwanted side effects medication like continue same uses trazodone 25 mg at night for sleep  The following portions of the patient's history were reviewed and updated as appropriate:PMHroutine: Social history , Allergies, Current Medications, Active Problem List and Health Maintenance    Review of Systems   Constitutional: Negative for activity change, appetite change and unexpected weight change.   HENT: Negative for nosebleeds and trouble swallowing.    Eyes: Negative for pain and visual disturbance.   Respiratory: Negative for chest tightness, shortness of breath and wheezing.    Cardiovascular: Negative for chest pain and palpitations.   Gastrointestinal: Negative for abdominal pain and blood in stool.   Endocrine: Negative.    Genitourinary: Negative for difficulty urinating and hematuria.   Musculoskeletal: Negative for joint  swelling.   Skin: Negative for color change and rash.   Allergic/Immunologic: Negative.    Neurological: Negative for syncope and speech difficulty.   Hematological: Negative for adenopathy.   Psychiatric/Behavioral: Negative for agitation and confusion.   All other systems reviewed and are negative.      Objective   Vitals:    05/20/19 1148   BP: 152/62   BP Location: Right arm   Patient Position: Sitting   Cuff Size: Adult   Pulse: 51   Temp: 98.5 °F (36.9 °C)   TempSrc: Oral   SpO2: 97%   Weight: 69.4 kg (153 lb)     Body mass index is 26.26 kg/m².  Physical Exam   Constitutional: He is oriented to person, place, and time. He appears well-developed and well-nourished. No distress.   HENT:   Head: Normocephalic and atraumatic.   Cerumen right hearing aid left   Eyes: Conjunctivae and EOM are normal. Pupils are equal, round, and reactive to light. Right eye exhibits no discharge. Left eye exhibits no discharge. No scleral icterus.   Neck: Normal range of motion. Neck supple. No tracheal deviation present. No thyromegaly present.   Cardiovascular: Normal rate, regular rhythm, normal heart sounds, intact distal pulses and normal pulses. Exam reveals no gallop.   No murmur heard.  Pulmonary/Chest: Effort normal and breath sounds normal. No respiratory distress. He has no wheezes. He has no rales.   Abdominal: Soft. Bowel sounds are normal. There is no tenderness.   Musculoskeletal: Normal range of motion. He exhibits no edema.   Neurological: He is alert and oriented to person, place, and time. He exhibits normal muscle tone. Coordination normal.   Skin: Skin is warm. No rash noted. No erythema. No pallor.   Psychiatric: He has a normal mood and affect. His behavior is normal. Judgment and thought content normal.   Nursing note and vitals reviewed.    Reviewed Data:  No visits with results within 1 Month(s) from this visit.   Latest known visit with results is:   Orders Only on 11/20/2018   Component Date Value Ref  Range Status   • Glucose 11/20/2018 106* 65 - 99 mg/dL Final   • BUN 11/20/2018 13  8 - 23 mg/dL Final   • Creatinine 11/20/2018 1.20  0.76 - 1.27 mg/dL Final   • eGFR Non  Am 11/20/2018 58* >60 mL/min/1.73 Final    Comment: The MDRD GFR formula is only valid for adults with stable  renal function between ages 18 and 70.     • eGFR  Am 11/20/2018 70  >60 mL/min/1.73 Final   • BUN/Creatinine Ratio 11/20/2018 10.8  7.0 - 25.0 Final   • Sodium 11/20/2018 140  136 - 145 mmol/L Final   • Potassium 11/20/2018 4.8  3.5 - 5.2 mmol/L Final   • Chloride 11/20/2018 99  98 - 107 mmol/L Final   • Total CO2 11/20/2018 32.5* 22.0 - 29.0 mmol/L Final   • Calcium 11/20/2018 9.5  8.6 - 10.5 mg/dL Final   • Total Protein 11/20/2018 7.0  6.0 - 8.5 g/dL Final   • Albumin 11/20/2018 4.60  3.50 - 5.20 g/dL Final   • Globulin 11/20/2018 2.4  gm/dL Final   • A/G Ratio 11/20/2018 1.9  g/dL Final   • Total Bilirubin 11/20/2018 0.5  0.1 - 1.2 mg/dL Final   • Alkaline Phosphatase 11/20/2018 69  39 - 117 U/L Final   • AST (SGOT) 11/20/2018 19  1 - 40 U/L Final   • ALT (SGPT) 11/20/2018 18  1 - 41 U/L Final

## 2019-05-20 NOTE — PROGRESS NOTES
QUICK REFERENCE INFORMATION:  The ABCs of the Annual Wellness Visit    Subsequent Medicare Wellness Visit     HEALTH RISK ASSESSMENT    : 1935    Recent Hospitalizations:  No hospitalization(s) within the last year..  ccc      Current Medical Providers:  Patient Care Team:  Asa Grace MD as PCP - General (Family Medicine)  Asa Grace MD as PCP - Claims Attributed  Chris Ibarra MD as Consulting Physician (Urology)  Roderick Chen MD as Consulting Physician (Vascular Surgery)        Smoking Status:  Social History     Tobacco Use   Smoking Status Never Smoker   Smokeless Tobacco Never Used       Alcohol Consumption:  Social History     Substance and Sexual Activity   Alcohol Use Not on file       Depression Screen:   PHQ-2/PHQ-9 Depression Screening 2019   Little interest or pleasure in doing things 0   Feeling down, depressed, or hopeless 0   Trouble falling or staying asleep, or sleeping too much -   Feeling tired or having little energy -   Poor appetite or overeating -   Feeling bad about yourself - or that you are a failure or have let yourself or your family down -   Trouble concentrating on things, such as reading the newspaper or watching television -   Moving or speaking so slowly that other people could have noticed. Or the opposite - being so fidgety or restless that you have been moving around a lot more than usual -   Thoughts that you would be better off dead, or of hurting yourself in some way -   Total Score 0       Health Habits and Functional and Cognitive Screening:  Functional & Cognitive Status 2019   Do you have difficulty preparing food and eating? No   Do you have difficulty bathing yourself, getting dressed or grooming yourself? No   Do you have difficulty using the toilet? No   Do you have difficulty moving around from place to place? No   Do you have trouble with steps or getting out of a bed or a chair? No   In the past year have you fallen or  experienced a near fall? Yes   Current Diet Well Balanced Diet   Dental Exam Up to date   Eye Exam Up to date   Exercise (times per week) 7 times per week   Current Exercise Activities Include Walking   Do you need help using the phone?  No   Are you deaf or do you have serious difficulty hearing?  Yes   Do you need help with transportation? No   Do you need help shopping? No   Do you need help preparing meals?  No   Do you need help with housework?  No   Do you need help with laundry? No   Do you need help taking your medications? No   Do you need help managing money? No   Do you ever drive or ride in a car without wearing a seat belt? No   Have you felt unusual stress, anger or loneliness in the last month? No   Who do you live with? Spouse   If you need help, do you have trouble finding someone available to you? No   Have you been bothered in the last four weeks by sexual problems? No   Do you have difficulty concentrating, remembering or making decisions? No           Does the patient have evidence of cognitive impairment? No    Asiprin use counseling: Does not need AS but is currently taking (discussed benefits vs risks and patient elects to stay on ASA)      Recent Lab Results:    Lab Results   Component Value Date     (H) 11/20/2018        Lab Results   Component Value Date    CHOL 154 04/27/2017    TRIG 80 05/30/2018    HDL 57 05/30/2018    VLDL 16 05/30/2018    LDLHDL 1.28 04/27/2017           Age-appropriate Screening Schedule:  Refer to the list below for future screening recommendations based on patient's age, sex and/or medical conditions. Orders for these recommended tests are listed in the plan section. The patient has been provided with a written plan.    Health Maintenance   Topic Date Due   • ZOSTER VACCINE (2 of 2) 05/10/2017   • LIPID PANEL  05/30/2019   • INFLUENZA VACCINE  08/01/2019   • TDAP/TD VACCINES (2 - Td) 11/08/2026   • PNEUMOCOCCAL VACCINES (65+ LOW/MEDIUM RISK)  Completed         Subjective   History of Present Illness    Praveen Torres is a 84 y.o. male who presents for an Annual Wellness Visit.    The following portions of the patient's history were reviewed and updated as appropriate: allergies, current medications, past family history, past medical history, past social history, past surgical history and problem list.    Outpatient Medications Prior to Visit   Medication Sig Dispense Refill   • acyclovir (ZOVIRAX) 5 % cream Apply 1 application topically every 3 (three) hours. 5 g 3   • aspirin 81 MG tablet Take 81 mg by mouth Daily.     • coenzyme Q10 100 MG capsule Take 100 mg by mouth 2 (two) times a day.     • cyanocobalamin 1000 MCG/ML injection INJECT 1 ML INTO THE SHOULDER, THIGH, OR BUTTOCK EVERY 28 DAYS 3 mL 2   • Multiple Vitamin (MULTI VITAMIN DAILY PO) Take 1 tablet by mouth Daily.     • Polyethylene Glycol 3350 (MIRALAX PO) Take 1 application by mouth Daily As Needed.     • escitalopram (LEXAPRO) 10 MG tablet TAKE 1 TABLET DAILY 30 tablet 0   • lisinopril (PRINIVIL,ZESTRIL) 10 MG tablet TAKE 1 TABLET DAILY 30 tablet 0   • simvastatin (ZOCOR) 10 MG tablet TAKE 1 TABLET DAILY 30 tablet 0   • traZODone (DESYREL) 50 MG tablet Take 0.5 tablets by mouth Every Night. 90 tablet 3     No facility-administered medications prior to visit.        Patient Active Problem List   Diagnosis   • Mixed hyperlipidemia   • Essential hypertension   • HSV infection   • Anxiety   • Anemia due to vitamin B12 deficiency   • Primary insomnia       Advance Care Planning:  Patient has an advance directive - a copy has not been provided. Have asked the patient to send this to us to add to record    Identification of Risk Factors:  Risk factors include: controlled.    Review of Systems    Compared to one year ago, the patient feels his physical health is the same.  Compared to one year ago, the patient feels his mental health is the same.    Objective     Physical Exam    Vitals:    05/20/19 1148   BP:  152/62   BP Location: Right arm   Patient Position: Sitting   Cuff Size: Adult   Pulse: 51   Temp: 98.5 °F (36.9 °C)   TempSrc: Oral   SpO2: 97%   Weight: 69.4 kg (153 lb)   PainSc:   2       Patient's Body mass index is 26.26 kg/m². BMI is within normal parameters. No follow-up required..      Assessment/Plan   Patient Self-Management and Personalized Health Advice  The patient has been provided with information about: stay active and preventive services including:   · shingrix.    Visit Diagnoses:    ICD-10-CM ICD-9-CM   1. Essential hypertension I10 401.9   2. Mixed hyperlipidemia E78.2 272.2   3. Anxiety F41.9 300.00       Orders Placed This Encounter   Procedures   • Comprehensive Metabolic Panel   • Lipid Panel       Outpatient Encounter Medications as of 5/20/2019   Medication Sig Dispense Refill   • acyclovir (ZOVIRAX) 5 % cream Apply 1 application topically every 3 (three) hours. 5 g 3   • aspirin 81 MG tablet Take 81 mg by mouth Daily.     • coenzyme Q10 100 MG capsule Take 100 mg by mouth 2 (two) times a day.     • cyanocobalamin 1000 MCG/ML injection INJECT 1 ML INTO THE SHOULDER, THIGH, OR BUTTOCK EVERY 28 DAYS 3 mL 2   • escitalopram (LEXAPRO) 10 MG tablet Take 1 tablet by mouth Daily. 90 tablet 2   • lisinopril (PRINIVIL,ZESTRIL) 10 MG tablet Take 1 tablet by mouth Daily. 90 tablet 2   • Multiple Vitamin (MULTI VITAMIN DAILY PO) Take 1 tablet by mouth Daily.     • Polyethylene Glycol 3350 (MIRALAX PO) Take 1 application by mouth Daily As Needed.     • simvastatin (ZOCOR) 10 MG tablet Take 1 tablet by mouth Daily. 90 tablet 2   • traZODone (DESYREL) 50 MG tablet Take 0.5 tablets by mouth Every Night. 45 tablet 2   • [DISCONTINUED] escitalopram (LEXAPRO) 10 MG tablet TAKE 1 TABLET DAILY 30 tablet 0   • [DISCONTINUED] lisinopril (PRINIVIL,ZESTRIL) 10 MG tablet TAKE 1 TABLET DAILY 30 tablet 0   • [DISCONTINUED] simvastatin (ZOCOR) 10 MG tablet TAKE 1 TABLET DAILY 30 tablet 0   • [DISCONTINUED] traZODone  (DESYREL) 50 MG tablet Take 0.5 tablets by mouth Every Night. 90 tablet 3     No facility-administered encounter medications on file as of 5/20/2019.        Reviewed use of high risk medication in the elderly: yes  Reviewed for potential of harmful drug interactions in the elderly: yes    Follow Up:  Return in about 6 months (around 11/20/2019), or if symptoms worsen or fail to improve, for Recheck, Next scheduled follow up.     An After Visit Summary and PPPS with all of these plans were given to the patient.

## 2019-05-21 RX ORDER — TRAZODONE HYDROCHLORIDE 50 MG/1
25 TABLET ORAL NIGHTLY
Qty: 45 TABLET | Refills: 2 | Status: SHIPPED | OUTPATIENT
Start: 2019-05-21 | End: 2019-11-21 | Stop reason: SDUPTHER

## 2019-11-18 RX ORDER — CYANOCOBALAMIN 1000 UG/ML
INJECTION, SOLUTION INTRAMUSCULAR; SUBCUTANEOUS
Qty: 3 ML | Refills: 4 | Status: SHIPPED | OUTPATIENT
Start: 2019-11-18 | End: 2020-11-24 | Stop reason: SDUPTHER

## 2019-11-21 ENCOUNTER — OFFICE VISIT (OUTPATIENT)
Dept: INTERNAL MEDICINE | Facility: CLINIC | Age: 84
End: 2019-11-21

## 2019-11-21 ENCOUNTER — APPOINTMENT (OUTPATIENT)
Dept: LAB | Facility: HOSPITAL | Age: 84
End: 2019-11-21

## 2019-11-21 VITALS
TEMPERATURE: 98.6 F | OXYGEN SATURATION: 97 % | HEART RATE: 55 BPM | BODY MASS INDEX: 25.16 KG/M2 | SYSTOLIC BLOOD PRESSURE: 172 MMHG | HEIGHT: 64 IN | DIASTOLIC BLOOD PRESSURE: 68 MMHG | WEIGHT: 147.4 LBS

## 2019-11-21 DIAGNOSIS — I10 ESSENTIAL HYPERTENSION: Primary | ICD-10-CM

## 2019-11-21 DIAGNOSIS — E78.2 MIXED HYPERLIPIDEMIA: ICD-10-CM

## 2019-11-21 DIAGNOSIS — F51.01 PRIMARY INSOMNIA: ICD-10-CM

## 2019-11-21 DIAGNOSIS — F41.9 ANXIETY: ICD-10-CM

## 2019-11-21 LAB
ANION GAP SERPL CALCULATED.3IONS-SCNC: 10.5 MMOL/L (ref 5–15)
BUN BLD-MCNC: 14 MG/DL (ref 8–23)
BUN/CREAT SERPL: 11.8 (ref 7–25)
CALCIUM SPEC-SCNC: 9.2 MG/DL (ref 8.6–10.5)
CHLORIDE SERPL-SCNC: 104 MMOL/L (ref 98–107)
CO2 SERPL-SCNC: 30.5 MMOL/L (ref 22–29)
CREAT BLD-MCNC: 1.19 MG/DL (ref 0.76–1.27)
GFR SERPL CREATININE-BSD FRML MDRD: 58 ML/MIN/1.73
GLUCOSE BLD-MCNC: 112 MG/DL (ref 65–99)
POTASSIUM BLD-SCNC: 4.9 MMOL/L (ref 3.5–5.2)
SODIUM BLD-SCNC: 145 MMOL/L (ref 136–145)

## 2019-11-21 PROCEDURE — 80048 BASIC METABOLIC PNL TOTAL CA: CPT | Performed by: FAMILY MEDICINE

## 2019-11-21 PROCEDURE — 99214 OFFICE O/P EST MOD 30 MIN: CPT | Performed by: FAMILY MEDICINE

## 2019-11-21 PROCEDURE — 36415 COLL VENOUS BLD VENIPUNCTURE: CPT | Performed by: FAMILY MEDICINE

## 2019-11-21 RX ORDER — AMLODIPINE BESYLATE 2.5 MG/1
2.5 TABLET ORAL DAILY
Qty: 30 TABLET | Refills: 6 | Status: CANCELLED | OUTPATIENT
Start: 2019-11-21

## 2019-11-21 RX ORDER — ESCITALOPRAM OXALATE 10 MG/1
10 TABLET ORAL DAILY
Qty: 90 TABLET | Refills: 2 | Status: SHIPPED | OUTPATIENT
Start: 2019-11-21 | End: 2020-10-19

## 2019-11-21 RX ORDER — TRAZODONE HYDROCHLORIDE 50 MG/1
25 TABLET ORAL NIGHTLY
Qty: 45 TABLET | Refills: 2 | Status: SHIPPED | OUTPATIENT
Start: 2019-11-21 | End: 2020-10-19

## 2019-11-21 RX ORDER — LISINOPRIL 10 MG/1
10 TABLET ORAL DAILY
Qty: 90 TABLET | Refills: 2 | Status: SHIPPED | OUTPATIENT
Start: 2019-11-21 | End: 2020-10-19

## 2019-11-21 RX ORDER — SIMVASTATIN 10 MG
10 TABLET ORAL DAILY
Qty: 90 TABLET | Refills: 2 | Status: SHIPPED | OUTPATIENT
Start: 2019-11-21 | End: 2020-10-19

## 2019-11-21 RX ORDER — ZOSTER VACCINE RECOMBINANT, ADJUVANTED 50 MCG/0.5
KIT INTRAMUSCULAR
COMMUNITY
Start: 2019-09-26 | End: 2019-11-21

## 2019-11-21 NOTE — PROGRESS NOTES
Praveen Torres is a 84 y.o. male.      Assessment/Plan   Problem List Items Addressed This Visit        Cardiovascular and Mediastinum    Mixed hyperlipidemia    Relevant Medications    simvastatin (ZOCOR) 10 MG tablet    Essential hypertension - Primary    Relevant Medications    lisinopril (PRINIVIL,ZESTRIL) 10 MG tablet    Other Relevant Orders    Basic Metabolic Panel       Other    Anxiety    Primary insomnia         Follow-up results of blood work for ongoing management of chronic problems continue prescriptions as prescribed monitor blood pressure with goal less than 150/90 watch for signs and symptoms of simvastatin tolerance continue the Lexapro for anxiety follow-up otherwise as needed or    Return in about 6 months (around 5/21/2020), or if symptoms worsen or fail to improve, for Recheck, Next scheduled follow up.      Chief Complaint   Patient presents with   • follow up to hypertension   • follow up to hyperlipidemia     Social History     Tobacco Use   • Smoking status: Never Smoker   • Smokeless tobacco: Never Used   Substance Use Topics   • Alcohol use: Not on file   • Drug use: Not on file       History of Present Illness   Patient follows up for chronic problems of hypertension hyperlipidemia anxiety insomnia he is doing well with present treatment plan like refills on his medications no he has no unwanted side effects no acute concerns  The following portions of the patient's history were reviewed and updated as appropriate:PMHroutine: Social history , Allergies, Current Medications, Active Problem List and Health Maintenance    Review of Systems   Constitutional: Negative for activity change, appetite change and unexpected weight change.   HENT: Negative for nosebleeds and trouble swallowing.    Eyes: Negative for pain and visual disturbance.   Respiratory: Negative for chest tightness, shortness of breath and wheezing.    Cardiovascular: Negative for chest pain and palpitations.  "  Gastrointestinal: Negative for abdominal pain and blood in stool.   Endocrine: Negative.    Genitourinary: Negative for difficulty urinating and hematuria.   Musculoskeletal: Negative for joint swelling.   Skin: Negative for color change and rash.   Allergic/Immunologic: Negative.    Neurological: Negative for syncope and speech difficulty.   Hematological: Negative for adenopathy.   Psychiatric/Behavioral: Negative for agitation and confusion.   All other systems reviewed and are negative.      Objective   Vitals:    11/21/19 1351   BP: 172/68   BP Location: Right arm   Patient Position: Sitting   Cuff Size: Adult   Pulse: 55   Temp: 98.6 °F (37 °C)   TempSrc: Oral   SpO2: 97%   Weight: 66.9 kg (147 lb 6.4 oz)   Height: 162.6 cm (64\")     Body mass index is 25.3 kg/m².  Physical Exam   Constitutional: He is oriented to person, place, and time. He appears well-developed and well-nourished. No distress.   HENT:   Head: Normocephalic and atraumatic.   Mouth/Throat: Oropharynx is clear and moist.   Eyes: Conjunctivae and EOM are normal. Pupils are equal, round, and reactive to light. Right eye exhibits no discharge. Left eye exhibits no discharge. No scleral icterus.   Neck: Normal range of motion. Neck supple. No tracheal deviation present. No thyromegaly present.   Cardiovascular: Normal rate, regular rhythm, normal heart sounds, intact distal pulses and normal pulses. Exam reveals no gallop.   No murmur heard.  Pulmonary/Chest: Effort normal and breath sounds normal. No respiratory distress. He has no wheezes. He has no rales.   Abdominal: Soft. Bowel sounds are normal. There is no tenderness.   Musculoskeletal: Normal range of motion. He exhibits no edema.   Neurological: He is alert and oriented to person, place, and time. He exhibits normal muscle tone. Coordination normal.   Skin: Skin is warm. No rash noted. No erythema. No pallor.   Psychiatric: He has a normal mood and affect. His behavior is normal. " Judgment and thought content normal.   Nursing note and vitals reviewed.    Reviewed Data:  No visits with results within 1 Month(s) from this visit.   Latest known visit with results is:   Office Visit on 05/20/2019   Component Date Value Ref Range Status   • Glucose 05/20/2019 88  65 - 99 mg/dL Final   • BUN 05/20/2019 14  8 - 23 mg/dL Final   • Creatinine 05/20/2019 1.34* 0.76 - 1.27 mg/dL Final   • eGFR Non African Am 05/20/2019 51* >60 mL/min/1.73 Final    Comment: The MDRD GFR formula is only valid for adults with stable  renal function between ages 18 and 70.     • eGFR  Am 05/20/2019 62  >60 mL/min/1.73 Final   • BUN/Creatinine Ratio 05/20/2019 10.4  7.0 - 25.0 Final   • Sodium 05/20/2019 141  136 - 145 mmol/L Final   • Potassium 05/20/2019 5.0  3.5 - 5.2 mmol/L Final   • Chloride 05/20/2019 101  98 - 107 mmol/L Final   • Total CO2 05/20/2019 31.2* 22.0 - 29.0 mmol/L Final   • Calcium 05/20/2019 9.9  8.6 - 10.5 mg/dL Final   • Total Protein 05/20/2019 6.6  6.0 - 8.5 g/dL Final   • Albumin 05/20/2019 4.50  3.50 - 5.20 g/dL Final   • Globulin 05/20/2019 2.1  gm/dL Final   • A/G Ratio 05/20/2019 2.1  g/dL Final   • Total Bilirubin 05/20/2019 0.4  0.2 - 1.2 mg/dL Final   • Alkaline Phosphatase 05/20/2019 73  39 - 117 U/L Final   • AST (SGOT) 05/20/2019 19  1 - 40 U/L Final   • ALT (SGPT) 05/20/2019 15  1 - 41 U/L Final   • Total Cholesterol 05/20/2019 160  0 - 200 mg/dL Final   • Triglycerides 05/20/2019 97  0 - 150 mg/dL Final   • HDL Cholesterol 05/20/2019 61* 40 - 60 mg/dL Final   • VLDL Cholesterol 05/20/2019 19.4  mg/dL Final   • LDL Cholesterol  05/20/2019 80  0 - 100 mg/dL Final

## 2020-09-18 ENCOUNTER — CLINICAL SUPPORT (OUTPATIENT)
Dept: INTERNAL MEDICINE | Facility: CLINIC | Age: 85
End: 2020-09-18

## 2020-09-18 PROCEDURE — G0008 ADMIN INFLUENZA VIRUS VAC: HCPCS | Performed by: FAMILY MEDICINE

## 2020-09-18 PROCEDURE — 90694 VACC AIIV4 NO PRSRV 0.5ML IM: CPT | Performed by: FAMILY MEDICINE

## 2020-10-14 NOTE — TELEPHONE ENCOUNTER
Pt informed  ----- Message from ROXANNE Maldonado sent at 3/16/2017  2:33 PM EDT -----  Please call patient with results.  His cxr is normal. No effusion or edema, heart normal size. We should do a rib xray due to the bruising on his sternum. I will put in the order and he can come back to the office for xray appt only tomorrow or Monday if possible.     No

## 2020-10-19 RX ORDER — SIMVASTATIN 10 MG
TABLET ORAL
Qty: 90 TABLET | Refills: 0 | Status: SHIPPED | OUTPATIENT
Start: 2020-10-19 | End: 2020-11-24 | Stop reason: SDUPTHER

## 2020-10-19 RX ORDER — LISINOPRIL 10 MG/1
TABLET ORAL
Qty: 90 TABLET | Refills: 0 | Status: SHIPPED | OUTPATIENT
Start: 2020-10-19 | End: 2020-11-24 | Stop reason: SDUPTHER

## 2020-10-19 RX ORDER — TRAZODONE HYDROCHLORIDE 50 MG/1
TABLET ORAL
Qty: 45 TABLET | Refills: 0 | Status: SHIPPED | OUTPATIENT
Start: 2020-10-19 | End: 2020-11-24 | Stop reason: SDUPTHER

## 2020-10-19 RX ORDER — ESCITALOPRAM OXALATE 10 MG/1
TABLET ORAL
Qty: 90 TABLET | Refills: 0 | Status: SHIPPED | OUTPATIENT
Start: 2020-10-19 | End: 2020-11-24 | Stop reason: SDUPTHER

## 2020-11-24 ENCOUNTER — OFFICE VISIT (OUTPATIENT)
Dept: INTERNAL MEDICINE | Facility: CLINIC | Age: 85
End: 2020-11-24

## 2020-11-24 VITALS
DIASTOLIC BLOOD PRESSURE: 70 MMHG | HEIGHT: 64 IN | SYSTOLIC BLOOD PRESSURE: 164 MMHG | WEIGHT: 148.7 LBS | OXYGEN SATURATION: 99 % | BODY MASS INDEX: 25.39 KG/M2 | HEART RATE: 50 BPM

## 2020-11-24 DIAGNOSIS — D51.8 OTHER VITAMIN B12 DEFICIENCY ANEMIA: ICD-10-CM

## 2020-11-24 DIAGNOSIS — F51.01 PRIMARY INSOMNIA: ICD-10-CM

## 2020-11-24 DIAGNOSIS — F41.9 ANXIETY: ICD-10-CM

## 2020-11-24 DIAGNOSIS — E78.2 MIXED HYPERLIPIDEMIA: ICD-10-CM

## 2020-11-24 DIAGNOSIS — Z00.00 MEDICARE ANNUAL WELLNESS VISIT, SUBSEQUENT: Primary | ICD-10-CM

## 2020-11-24 DIAGNOSIS — I10 ESSENTIAL HYPERTENSION: ICD-10-CM

## 2020-11-24 PROCEDURE — G0439 PPPS, SUBSEQ VISIT: HCPCS | Performed by: FAMILY MEDICINE

## 2020-11-24 PROCEDURE — 99214 OFFICE O/P EST MOD 30 MIN: CPT | Performed by: FAMILY MEDICINE

## 2020-11-24 RX ORDER — SIMVASTATIN 10 MG
10 TABLET ORAL DAILY
Qty: 90 TABLET | Refills: 3 | Status: SHIPPED | OUTPATIENT
Start: 2020-11-24 | End: 2020-12-28

## 2020-11-24 RX ORDER — ESCITALOPRAM OXALATE 10 MG/1
10 TABLET ORAL DAILY
Qty: 90 TABLET | Refills: 3 | Status: SHIPPED | OUTPATIENT
Start: 2020-11-24 | End: 2020-12-28

## 2020-11-24 RX ORDER — AMLODIPINE BESYLATE 5 MG/1
5 TABLET ORAL DAILY
Status: CANCELLED | OUTPATIENT
Start: 2020-11-24

## 2020-11-24 RX ORDER — CYANOCOBALAMIN 1000 UG/ML
1000 INJECTION, SOLUTION INTRAMUSCULAR; SUBCUTANEOUS
Qty: 3 ML | Refills: 4 | Status: SHIPPED | OUTPATIENT
Start: 2020-11-24 | End: 2020-12-21

## 2020-11-24 RX ORDER — TRAZODONE HYDROCHLORIDE 50 MG/1
25 TABLET ORAL NIGHTLY
Qty: 45 TABLET | Refills: 3 | Status: SHIPPED | OUTPATIENT
Start: 2020-11-24 | End: 2020-12-28

## 2020-11-24 RX ORDER — LISINOPRIL 20 MG/1
20 TABLET ORAL DAILY
Qty: 90 TABLET | Refills: 3 | Status: SHIPPED | OUTPATIENT
Start: 2020-11-24 | End: 2021-06-01

## 2020-11-24 NOTE — PROGRESS NOTES
Praveen Torres is a 85 y.o. male.      Assessment/Plan   Problems Addressed this Visit        Cardiovascular and Mediastinum    Mixed hyperlipidemia    Relevant Medications    simvastatin (ZOCOR) 10 MG tablet    Essential hypertension    Relevant Medications    lisinopril (PRINIVIL,ZESTRIL) 20 MG tablet    Other Relevant Orders    Lipid Panel    Comprehensive Metabolic Panel       Digestive    Anemia due to vitamin B12 deficiency    Relevant Medications    cyanocobalamin 1000 MCG/ML injection    Other Relevant Orders    CBC & Differential    Methylmalonic Acid, Serum       Other    Anxiety    Primary insomnia    Medicare annual wellness visit, subsequent - Primary    Relevant Orders    Lipid Panel      Diagnoses       Codes Comments    Medicare annual wellness visit, subsequent    -  Primary ICD-10-CM: Z00.00  ICD-9-CM: V70.0     Essential hypertension     ICD-10-CM: I10  ICD-9-CM: 401.9     Anxiety     ICD-10-CM: F41.9  ICD-9-CM: 300.00     Primary insomnia     ICD-10-CM: F51.01  ICD-9-CM: 307.42     Mixed hyperlipidemia     ICD-10-CM: E78.2  ICD-9-CM: 272.2     Other vitamin B12 deficiency anemia     ICD-10-CM: D51.8  ICD-9-CM: 281.1            Hypertension increase lisinopril to 20 mg daily  Hyperlipidemia continue simvastatin  Insomnia continue trazodone 25 mg  Anxiety depression continue Lexapro 10 mg  Follow-up results of blood work and as needed otherwise  Return in about 6 months (around 5/24/2021) for Recheck.      Chief Complaint   Patient presents with   • follow up to hypertension   • follow up to hyperlipidemia   • follow up to anxiety   • follow up to insomnia   • Medicare Wellness-subsequent     Social History     Tobacco Use   • Smoking status: Never Smoker   • Smokeless tobacco: Never Used   Substance Use Topics   • Alcohol use: Not on file   • Drug use: Not on file       History of Present Illness   Patient follow-up appointment discuss management of chronic medical problems of hypertension  "hyperlipidemia anxiety insomnia doing well with no acute concerns other than some elevated blood pressure readings in the office usually at home he says they are in the 1 30-1 40 range no chest pain no shortness of breath no increased fatigue  Sleep is good with present treatment of trazodone she is trying to watch his diet for control of his hyperlipidemia taking simvastatin  He would like continue Lexapro because of positive  effect on his anxiety and depression  The following portions of the patient's history were reviewed and updated as appropriate:PMHroutine: Social history , Allergies, Current Medications, Active Problem List and Health Maintenance    Review of Systems   Constitutional: Negative for activity change, appetite change and unexpected weight change.   HENT: Negative for nosebleeds and trouble swallowing.    Eyes: Negative for pain and visual disturbance.   Respiratory: Negative for chest tightness, shortness of breath and wheezing.    Cardiovascular: Negative for chest pain and palpitations.   Gastrointestinal: Negative for abdominal pain and blood in stool.   Endocrine: Negative.    Genitourinary: Negative for difficulty urinating and hematuria.   Musculoskeletal: Negative for joint swelling.   Skin: Negative for color change and rash.   Allergic/Immunologic: Negative.    Neurological: Negative for syncope and speech difficulty.   Hematological: Negative for adenopathy.   Psychiatric/Behavioral: Negative for agitation and confusion.   All other systems reviewed and are negative.      Objective   Vitals:    11/24/20 1144   BP: 164/70   BP Location: Left arm   Patient Position: Sitting   Cuff Size: Adult   Pulse: 50   SpO2: 99%   Weight: 67.4 kg (148 lb 11.2 oz)   Height: 162.6 cm (64\")     Body mass index is 25.52 kg/m².  Physical Exam  Vitals signs and nursing note reviewed.   Constitutional:       Appearance: Normal appearance. He is well-developed. He is not diaphoretic.   HENT:      Head: " Normocephalic and atraumatic.      Right Ear: Tympanic membrane, ear canal and external ear normal.      Left Ear: Tympanic membrane, ear canal and external ear normal.   Eyes:      General: Lids are normal. No scleral icterus.     Extraocular Movements: Extraocular movements intact.      Conjunctiva/sclera: Conjunctivae normal.   Neck:      Musculoskeletal: Normal range of motion.      Thyroid: No thyroid mass or thyromegaly.      Vascular: No carotid bruit or JVD.   Cardiovascular:      Rate and Rhythm: Normal rate and regular rhythm.      Pulses: Normal pulses.           Radial pulses are 2+ on the right side and 2+ on the left side.      Heart sounds: Normal heart sounds. No murmur.   Pulmonary:      Effort: Pulmonary effort is normal. No respiratory distress.      Breath sounds: Normal breath sounds.   Abdominal:      Palpations: Abdomen is soft.   Musculoskeletal:      Right lower leg: No edema.      Left lower leg: No edema.   Skin:     General: Skin is warm and dry.      Coloration: Skin is not pale.      Findings: No erythema or rash.   Neurological:      General: No focal deficit present.      Mental Status: He is alert and oriented to person, place, and time.      Sensory: No sensory deficit.      Deep Tendon Reflexes: Reflexes are normal and symmetric.   Psychiatric:         Mood and Affect: Mood normal.         Behavior: Behavior normal. Behavior is cooperative.         Thought Content: Thought content normal.         Judgment: Judgment normal.       Reviewed Data:  No visits with results within 1 Month(s) from this visit.   Latest known visit with results is:   Office Visit on 11/21/2019   Component Date Value Ref Range Status   • Glucose 11/21/2019 112* 65 - 99 mg/dL Final   • BUN 11/21/2019 14  8 - 23 mg/dL Final   • Creatinine 11/21/2019 1.19  0.76 - 1.27 mg/dL Final   • Sodium 11/21/2019 145  136 - 145 mmol/L Final   • Potassium 11/21/2019 4.9  3.5 - 5.2 mmol/L Final   • Chloride 11/21/2019 104  98  - 107 mmol/L Final   • CO2 11/21/2019 30.5* 22.0 - 29.0 mmol/L Final   • Calcium 11/21/2019 9.2  8.6 - 10.5 mg/dL Final   • eGFR Non African Amer 11/21/2019 58* >60 mL/min/1.73 Final   • BUN/Creatinine Ratio 11/21/2019 11.8  7.0 - 25.0 Final   • Anion Gap 11/21/2019 10.5  5.0 - 15.0 mmol/L Final

## 2020-11-24 NOTE — PROGRESS NOTES
The ABCs of the Annual Wellness Visit  Subsequent Medicare Wellness Visit    Chief Complaint   Patient presents with   • follow up to hypertension   • follow up to hyperlipidemia   • follow up to anxiety   • follow up to insomnia   • Medicare Wellness-subsequent       Subjective   History of Present Illness:  Praveen Torres is a 85 y.o. male who presents for a Subsequent Medicare Wellness Visit.    HEALTH RISK ASSESSMENT    Recent Hospitalizations:  No hospitalization(s) within the last year.    Current Medical Providers:  Patient Care Team:  Asa Grace MD as PCP - General (Family Medicine)  Chris Ibarra MD as Consulting Physician (Urology)  Roderick Chen MD as Consulting Physician (Vascular Surgery)    Smoking Status:  Social History     Tobacco Use   Smoking Status Never Smoker   Smokeless Tobacco Never Used       Alcohol Consumption:  Social History     Substance and Sexual Activity   Alcohol Use None       Depression Screen:   PHQ-2/PHQ-9 Depression Screening 11/24/2020   Little interest or pleasure in doing things 0   Feeling down, depressed, or hopeless 0   Trouble falling or staying asleep, or sleeping too much -   Feeling tired or having little energy -   Poor appetite or overeating -   Feeling bad about yourself - or that you are a failure or have let yourself or your family down -   Trouble concentrating on things, such as reading the newspaper or watching television -   Moving or speaking so slowly that other people could have noticed. Or the opposite - being so fidgety or restless that you have been moving around a lot more than usual -   Thoughts that you would be better off dead, or of hurting yourself in some way -   Total Score 0       Fall Risk Screen:  STEADI Fall Risk Assessment was completed, and patient is at LOW risk for falls.Assessment completed on:11/24/2020    Health Habits and Functional and Cognitive Screening:  Functional & Cognitive Status 11/24/2020   Do you have  difficulty preparing food and eating? No   Do you have difficulty bathing yourself, getting dressed or grooming yourself? No   Do you have difficulty using the toilet? No   Do you have difficulty moving around from place to place? No   Do you have trouble with steps or getting out of a bed or a chair? No   Current Diet Well Balanced Diet   Dental Exam Up to date   Eye Exam Up to date   Exercise (times per week) 7 times per week   Current Exercise Activities Include Walking   Do you need help using the phone?  No   Are you deaf or do you have serious difficulty hearing?  Yes   Do you need help with transportation? No   Do you need help shopping? No   Do you need help preparing meals?  No   Do you need help with housework?  No   Do you need help with laundry? No   Do you need help taking your medications? No   Do you need help managing money? No   Do you ever drive or ride in a car without wearing a seat belt? No   Have you felt unusual stress, anger or loneliness in the last month? No   Who do you live with? Spouse   If you need help, do you have trouble finding someone available to you? No   Have you been bothered in the last four weeks by sexual problems? No   Do you have difficulty concentrating, remembering or making decisions? No         Does the patient have evidence of cognitive impairment? No    Asprin use counseling:Does not need ASA (and currently is not on it)    Age-appropriate Screening Schedule:  Refer to the list below for future screening recommendations based on patient's age, sex and/or medical conditions. Orders for these recommended tests are listed in the plan section. The patient has been provided with a written plan.    Health Maintenance   Topic Date Due   • LIPID PANEL  05/20/2020   • TDAP/TD VACCINES (2 - Td) 11/08/2026   • INFLUENZA VACCINE  Completed   • ZOSTER VACCINE  Addressed          The following portions of the patient's history were reviewed and updated as appropriate: allergies,  "current medications, past family history, past medical history, past social history, past surgical history and problem list.    Outpatient Medications Prior to Visit   Medication Sig Dispense Refill   • acyclovir (ZOVIRAX) 5 % cream Apply 1 application topically every 3 (three) hours. 5 g 3   • coenzyme Q10 100 MG capsule Take 100 mg by mouth 2 (two) times a day.     • Multiple Vitamin (MULTI VITAMIN DAILY PO) Take 1 tablet by mouth Daily.     • cyanocobalamin 1000 MCG/ML injection INJECT 1 ML INTO THE SHOULDER, THIGH, OR BUTTOCK EVERY 28 DAYS 3 mL 4   • escitalopram (LEXAPRO) 10 MG tablet TAKE 1 TABLET DAILY 90 tablet 0   • lisinopril (PRINIVIL,ZESTRIL) 10 MG tablet TAKE 1 TABLET DAILY 90 tablet 0   • simvastatin (ZOCOR) 10 MG tablet TAKE 1 TABLET DAILY 90 tablet 0   • traZODone (DESYREL) 50 MG tablet TAKE ONE-HALF (1/2) TABLET EVERY NIGHT 45 tablet 0     No facility-administered medications prior to visit.        Patient Active Problem List   Diagnosis   • Mixed hyperlipidemia   • Essential hypertension   • HSV infection   • Anxiety   • Anemia due to vitamin B12 deficiency   • Primary insomnia   • Medicare annual wellness visit, subsequent       Advanced Care Planning:  ACP discussion was held with the patient during this visit. Patient has an advance directive in EMR which is still valid.     Review of Systems    Compared to one year ago, the patient feels his physical health is the same.  Compared to one year ago, the patient feels his mental health is the same.    Reviewed chart for potential of high risk medication in the elderly: yes  Reviewed chart for potential of harmful drug interactions in the elderly:yes    Objective         Vitals:    11/24/20 1144   BP: 164/70   BP Location: Left arm   Patient Position: Sitting   Cuff Size: Adult   Pulse: 50   SpO2: 99%   Weight: 67.4 kg (148 lb 11.2 oz)   Height: 162.6 cm (64\")   PainSc:   2       Body mass index is 25.52 kg/m².  Discussed the patient's BMI with him. " The BMI is in the acceptable range.    Physical Exam          Assessment/Plan   Medicare Risks and Personalized Health Plan  CMS Preventative Services Quick Reference  Advance Directive Discussion  Inactivity/Sedentary    The above risks/problems have been discussed with the patient.  Pertinent information has been shared with the patient in the After Visit Summary.  Follow up plans and orders are seen below in the Assessment/Plan Section.    Diagnoses and all orders for this visit:    1. Medicare annual wellness visit, subsequent (Primary)  -     Lipid Panel    2. Essential hypertension  -     Lipid Panel  -     Comprehensive Metabolic Panel    3. Anxiety    4. Primary insomnia    5. Mixed hyperlipidemia    6. Other vitamin B12 deficiency anemia  -     CBC & Differential  -     Methylmalonic Acid, Serum    Other orders  -     Cancel: amLODIPine (NORVASC) 5 MG tablet; Take 1 tablet by mouth Daily.  Dispense:    -     lisinopril (PRINIVIL,ZESTRIL) 20 MG tablet; Take 1 tablet by mouth Daily.  Dispense: 90 tablet; Refill: 3  -     simvastatin (ZOCOR) 10 MG tablet; Take 1 tablet by mouth Daily.  Dispense: 90 tablet; Refill: 3  -     traZODone (DESYREL) 50 MG tablet; Take 0.5 tablets by mouth Every Night.  Dispense: 45 tablet; Refill: 3  -     escitalopram (LEXAPRO) 10 MG tablet; Take 1 tablet by mouth Daily.  Dispense: 90 tablet; Refill: 3  -     cyanocobalamin 1000 MCG/ML injection; Inject 1 mL into the appropriate muscle as directed by prescriber Every 30 (Thirty) Days.  Dispense: 3 mL; Refill: 4      Follow Up:  Return in about 6 months (around 5/24/2021) for Recheck.     An After Visit Summary and PPPS were given to the patient.       Ongoing management of chronic medical problems

## 2020-11-25 ENCOUNTER — LAB (OUTPATIENT)
Dept: LAB | Facility: HOSPITAL | Age: 85
End: 2020-11-25

## 2020-11-25 DIAGNOSIS — R79.89 ELEVATED SERUM CREATININE: Primary | ICD-10-CM

## 2020-11-25 LAB
ALBUMIN SERPL-MCNC: 4.4 G/DL (ref 3.5–5.2)
ALBUMIN/GLOB SERPL: 2.1 G/DL
ALP SERPL-CCNC: 59 U/L (ref 39–117)
ALT SERPL W P-5'-P-CCNC: 16 U/L (ref 1–41)
ANION GAP SERPL CALCULATED.3IONS-SCNC: 7.2 MMOL/L (ref 5–15)
AST SERPL-CCNC: 17 U/L (ref 1–40)
BASOPHILS # BLD AUTO: 0.03 10*3/MM3 (ref 0–0.2)
BASOPHILS NFR BLD AUTO: 0.4 % (ref 0–1.5)
BILIRUB SERPL-MCNC: 0.4 MG/DL (ref 0–1.2)
BUN SERPL-MCNC: 12 MG/DL (ref 8–23)
BUN/CREAT SERPL: 7.7 (ref 7–25)
CALCIUM SPEC-SCNC: 9.1 MG/DL (ref 8.6–10.5)
CHLORIDE SERPL-SCNC: 102 MMOL/L (ref 98–107)
CHOLEST SERPL-MCNC: 147 MG/DL (ref 0–200)
CO2 SERPL-SCNC: 30.8 MMOL/L (ref 22–29)
CREAT SERPL-MCNC: 1.56 MG/DL (ref 0.76–1.27)
DEPRECATED RDW RBC AUTO: 42.9 FL (ref 37–54)
EOSINOPHIL # BLD AUTO: 0.14 10*3/MM3 (ref 0–0.4)
EOSINOPHIL NFR BLD AUTO: 2 % (ref 0.3–6.2)
ERYTHROCYTE [DISTWIDTH] IN BLOOD BY AUTOMATED COUNT: 12.8 % (ref 12.3–15.4)
GFR SERPL CREATININE-BSD FRML MDRD: 43 ML/MIN/1.73
GLOBULIN UR ELPH-MCNC: 2.1 GM/DL
GLUCOSE SERPL-MCNC: 102 MG/DL (ref 65–99)
HCT VFR BLD AUTO: 38.9 % (ref 37.5–51)
HDLC SERPL-MCNC: 52 MG/DL (ref 40–60)
HGB BLD-MCNC: 13 G/DL (ref 13–17.7)
IMM GRANULOCYTES # BLD AUTO: 0.01 10*3/MM3 (ref 0–0.05)
IMM GRANULOCYTES NFR BLD AUTO: 0.1 % (ref 0–0.5)
LDLC SERPL CALC-MCNC: 82 MG/DL (ref 0–100)
LDLC/HDLC SERPL: 1.57 {RATIO}
LYMPHOCYTES # BLD AUTO: 3.06 10*3/MM3 (ref 0.7–3.1)
LYMPHOCYTES NFR BLD AUTO: 44.7 % (ref 19.6–45.3)
MCH RBC QN AUTO: 30.7 PG (ref 26.6–33)
MCHC RBC AUTO-ENTMCNC: 33.4 G/DL (ref 31.5–35.7)
MCV RBC AUTO: 92 FL (ref 79–97)
MONOCYTES # BLD AUTO: 0.76 10*3/MM3 (ref 0.1–0.9)
MONOCYTES NFR BLD AUTO: 11.1 % (ref 5–12)
NEUTROPHILS NFR BLD AUTO: 2.84 10*3/MM3 (ref 1.7–7)
NEUTROPHILS NFR BLD AUTO: 41.7 % (ref 42.7–76)
NRBC BLD AUTO-RTO: 0 /100 WBC (ref 0–0.2)
PLATELET # BLD AUTO: 148 10*3/MM3 (ref 140–450)
PMV BLD AUTO: 11.8 FL (ref 6–12)
POTASSIUM SERPL-SCNC: 4.6 MMOL/L (ref 3.5–5.2)
PROT SERPL-MCNC: 6.5 G/DL (ref 6–8.5)
RBC # BLD AUTO: 4.23 10*6/MM3 (ref 4.14–5.8)
SODIUM SERPL-SCNC: 140 MMOL/L (ref 136–145)
TRIGL SERPL-MCNC: 67 MG/DL (ref 0–150)
VLDLC SERPL-MCNC: 13 MG/DL (ref 5–40)
WBC # BLD AUTO: 6.84 10*3/MM3 (ref 3.4–10.8)

## 2020-11-25 PROCEDURE — 36415 COLL VENOUS BLD VENIPUNCTURE: CPT | Performed by: FAMILY MEDICINE

## 2020-11-25 PROCEDURE — 85025 COMPLETE CBC W/AUTO DIFF WBC: CPT | Performed by: FAMILY MEDICINE

## 2020-11-25 PROCEDURE — 83921 ORGANIC ACID SINGLE QUANT: CPT | Performed by: FAMILY MEDICINE

## 2020-11-25 PROCEDURE — 80061 LIPID PANEL: CPT | Performed by: FAMILY MEDICINE

## 2020-11-25 PROCEDURE — 80053 COMPREHEN METABOLIC PANEL: CPT | Performed by: FAMILY MEDICINE

## 2020-11-25 RX ORDER — AMLODIPINE BESYLATE 5 MG/1
5 TABLET ORAL DAILY
Qty: 30 TABLET | Refills: 1 | Status: SHIPPED | OUTPATIENT
Start: 2020-11-25 | End: 2021-01-14 | Stop reason: SDUPTHER

## 2020-12-03 LAB
Lab: NORMAL
METHYLMALONATE SERPL-SCNC: 180 NMOL/L (ref 0–378)

## 2020-12-21 RX ORDER — CYANOCOBALAMIN 1000 UG/ML
INJECTION, SOLUTION INTRAMUSCULAR; SUBCUTANEOUS
Qty: 3 ML | Refills: 0 | Status: SHIPPED | OUTPATIENT
Start: 2020-12-21 | End: 2021-03-15

## 2020-12-28 RX ORDER — ESCITALOPRAM OXALATE 10 MG/1
TABLET ORAL
Qty: 90 TABLET | Refills: 3 | Status: SHIPPED | OUTPATIENT
Start: 2020-12-28 | End: 2021-06-01 | Stop reason: SDUPTHER

## 2020-12-28 RX ORDER — TRAZODONE HYDROCHLORIDE 50 MG/1
TABLET ORAL
Qty: 45 TABLET | Refills: 3 | Status: SHIPPED | OUTPATIENT
Start: 2020-12-28 | End: 2021-06-01 | Stop reason: SDUPTHER

## 2020-12-28 RX ORDER — SIMVASTATIN 10 MG
TABLET ORAL
Qty: 90 TABLET | Refills: 3 | Status: SHIPPED | OUTPATIENT
Start: 2020-12-28 | End: 2021-06-01 | Stop reason: SDUPTHER

## 2020-12-29 ENCOUNTER — LAB (OUTPATIENT)
Dept: LAB | Facility: HOSPITAL | Age: 85
End: 2020-12-29

## 2020-12-29 DIAGNOSIS — R79.89 ELEVATED SERUM CREATININE: ICD-10-CM

## 2020-12-29 LAB
ANION GAP SERPL CALCULATED.3IONS-SCNC: 7.9 MMOL/L (ref 5–15)
BUN SERPL-MCNC: 15 MG/DL (ref 8–23)
BUN/CREAT SERPL: 12.2 (ref 7–25)
CALCIUM SPEC-SCNC: 9.2 MG/DL (ref 8.6–10.5)
CHLORIDE SERPL-SCNC: 103 MMOL/L (ref 98–107)
CO2 SERPL-SCNC: 30.1 MMOL/L (ref 22–29)
CREAT SERPL-MCNC: 1.23 MG/DL (ref 0.76–1.27)
GFR SERPL CREATININE-BSD FRML MDRD: 56 ML/MIN/1.73
GLUCOSE SERPL-MCNC: 91 MG/DL (ref 65–99)
POTASSIUM SERPL-SCNC: 4.4 MMOL/L (ref 3.5–5.2)
SODIUM SERPL-SCNC: 141 MMOL/L (ref 136–145)

## 2020-12-29 PROCEDURE — 80048 BASIC METABOLIC PNL TOTAL CA: CPT

## 2020-12-29 PROCEDURE — 36415 COLL VENOUS BLD VENIPUNCTURE: CPT

## 2021-01-14 RX ORDER — AMLODIPINE BESYLATE 5 MG/1
5 TABLET ORAL DAILY
Qty: 90 TABLET | Refills: 1 | Status: SHIPPED | OUTPATIENT
Start: 2021-01-14 | End: 2021-06-01 | Stop reason: SDUPTHER

## 2021-03-09 DIAGNOSIS — Z23 IMMUNIZATION DUE: ICD-10-CM

## 2021-03-15 RX ORDER — CYANOCOBALAMIN 1000 UG/ML
INJECTION, SOLUTION INTRAMUSCULAR; SUBCUTANEOUS
Qty: 3 ML | Refills: 3 | Status: SHIPPED | OUTPATIENT
Start: 2021-03-15 | End: 2021-12-01 | Stop reason: SDUPTHER

## 2021-04-06 ENCOUNTER — TELEPHONE (OUTPATIENT)
Dept: INTERNAL MEDICINE | Facility: CLINIC | Age: 86
End: 2021-04-06

## 2021-04-06 NOTE — TELEPHONE ENCOUNTER
PATIENT WOULD LIKE TO REPORT HE HAS RECEIVED HIS COVID VACCINE ON MARCH 17TH, [facility] HE HAS ALREADY HAD IT.

## 2021-06-01 ENCOUNTER — LAB (OUTPATIENT)
Dept: LAB | Facility: HOSPITAL | Age: 86
End: 2021-06-01

## 2021-06-01 ENCOUNTER — OFFICE VISIT (OUTPATIENT)
Dept: INTERNAL MEDICINE | Facility: CLINIC | Age: 86
End: 2021-06-01

## 2021-06-01 VITALS
DIASTOLIC BLOOD PRESSURE: 66 MMHG | SYSTOLIC BLOOD PRESSURE: 135 MMHG | HEIGHT: 64 IN | OXYGEN SATURATION: 98 % | HEART RATE: 53 BPM | BODY MASS INDEX: 25.13 KG/M2 | WEIGHT: 147.2 LBS

## 2021-06-01 DIAGNOSIS — F51.01 PRIMARY INSOMNIA: ICD-10-CM

## 2021-06-01 DIAGNOSIS — D51.8 OTHER VITAMIN B12 DEFICIENCY ANEMIA: ICD-10-CM

## 2021-06-01 DIAGNOSIS — F41.9 ANXIETY: ICD-10-CM

## 2021-06-01 DIAGNOSIS — I10 ESSENTIAL HYPERTENSION: ICD-10-CM

## 2021-06-01 DIAGNOSIS — E78.2 MIXED HYPERLIPIDEMIA: Primary | ICD-10-CM

## 2021-06-01 LAB
ALBUMIN SERPL-MCNC: 4.3 G/DL (ref 3.5–5.2)
ALBUMIN/GLOB SERPL: 1.5 G/DL
ALP SERPL-CCNC: 70 U/L (ref 39–117)
ALT SERPL W P-5'-P-CCNC: 16 U/L (ref 1–41)
ANION GAP SERPL CALCULATED.3IONS-SCNC: 10.3 MMOL/L (ref 5–15)
AST SERPL-CCNC: 19 U/L (ref 1–40)
BILIRUB SERPL-MCNC: 0.4 MG/DL (ref 0–1.2)
BUN SERPL-MCNC: 12 MG/DL (ref 8–23)
BUN/CREAT SERPL: 11 (ref 7–25)
CALCIUM SPEC-SCNC: 8.9 MG/DL (ref 8.6–10.5)
CHLORIDE SERPL-SCNC: 101 MMOL/L (ref 98–107)
CO2 SERPL-SCNC: 28.7 MMOL/L (ref 22–29)
CREAT SERPL-MCNC: 1.09 MG/DL (ref 0.76–1.27)
GFR SERPL CREATININE-BSD FRML MDRD: 64 ML/MIN/1.73
GLOBULIN UR ELPH-MCNC: 2.8 GM/DL
GLUCOSE SERPL-MCNC: 92 MG/DL (ref 65–99)
POTASSIUM SERPL-SCNC: 4.6 MMOL/L (ref 3.5–5.2)
PROT SERPL-MCNC: 7.1 G/DL (ref 6–8.5)
SODIUM SERPL-SCNC: 140 MMOL/L (ref 136–145)

## 2021-06-01 PROCEDURE — 80053 COMPREHEN METABOLIC PANEL: CPT | Performed by: FAMILY MEDICINE

## 2021-06-01 PROCEDURE — 99214 OFFICE O/P EST MOD 30 MIN: CPT | Performed by: FAMILY MEDICINE

## 2021-06-01 PROCEDURE — 36415 COLL VENOUS BLD VENIPUNCTURE: CPT | Performed by: FAMILY MEDICINE

## 2021-06-01 RX ORDER — ASPIRIN 81 MG/1
81 TABLET, CHEWABLE ORAL DAILY
COMMUNITY

## 2021-06-01 RX ORDER — SIMVASTATIN 10 MG
10 TABLET ORAL DAILY
Qty: 90 TABLET | Refills: 3 | Status: SHIPPED | OUTPATIENT
Start: 2021-06-01 | End: 2022-01-27

## 2021-06-01 RX ORDER — TRAZODONE HYDROCHLORIDE 50 MG/1
25 TABLET ORAL NIGHTLY
Qty: 45 TABLET | Refills: 3 | Status: SHIPPED | OUTPATIENT
Start: 2021-06-01 | End: 2022-01-27

## 2021-06-01 RX ORDER — LOSARTAN POTASSIUM 25 MG/1
25 TABLET ORAL DAILY
Qty: 90 TABLET | Refills: 1 | Status: CANCELLED | OUTPATIENT
Start: 2021-06-01

## 2021-06-01 RX ORDER — AMLODIPINE BESYLATE 5 MG/1
5 TABLET ORAL DAILY
Qty: 90 TABLET | Refills: 3 | Status: SHIPPED | OUTPATIENT
Start: 2021-06-01 | End: 2022-02-22

## 2021-06-01 RX ORDER — ESCITALOPRAM OXALATE 10 MG/1
10 TABLET ORAL DAILY
Qty: 90 TABLET | Refills: 3 | Status: SHIPPED | OUTPATIENT
Start: 2021-06-01 | End: 2022-01-27

## 2021-06-01 NOTE — PROGRESS NOTES
"Chief Complaint  Follow up to hyperlipidemia, Follow up to hypertension, follow up to anemia, Follow up to anxiety, and Follow up to insomnia    Subjective          Praveen Torres presents to [facility]  History of Present Illness  Follows up for hyperlipidemia hypertension anemia anxiety insomnia his home blood pressure readings are less than 140/90 no chest pain shortness of breath or increased fatigue his mood is stable with citalopram and he has no unwanted interaction between calcium channel blocker and simvastatin.  Like continue the trazodone at 1/2 pill daily at nighttime for sleep  Objective   Vital Signs:   /66 (BP Location: Right arm, Patient Position: Sitting, Cuff Size: Adult)   Pulse 53   Ht 162.6 cm (64\")   Wt 66.8 kg (147 lb 3.2 oz)   SpO2 98%   BMI 25.27 kg/m²     Physical Exam  Constitutional:       Appearance: Normal appearance.   Cardiovascular:      Rate and Rhythm: Normal rate and regular rhythm.      Pulses: Normal pulses.      Heart sounds: Normal heart sounds.   Pulmonary:      Breath sounds: Normal breath sounds.   Abdominal:      General: Abdomen is flat. Bowel sounds are normal.      Palpations: Abdomen is soft.   Neurological:      Mental Status: He is alert.        Result Review :     Common labs    Common Labsle 11/25/20 11/25/20 11/25/20 12/29/20    0812 0812 0812    Glucose   102 (A) 91   BUN   12 15   Creatinine   1.56 (A) 1.23   eGFR Non  Am   43 (A) 56 (A)   Sodium   140 141   Potassium   4.6 4.4   Chloride   102 103   Calcium   9.1 9.2   Albumin   4.40    Total Bilirubin   0.4    Alkaline Phosphatase   59    AST (SGOT)   17    ALT (SGPT)   16    WBC 6.84      Hemoglobin 13.0      Hematocrit 38.9      Platelets 148      Total Cholesterol  147     Triglycerides  67     HDL Cholesterol  52     LDL Cholesterol   82     (A) Abnormal value                      Assessment and Plan    Diagnoses and all orders for this visit:    1. Mixed " hyperlipidemia (Primary)    2. Essential hypertension  -     Comprehensive Metabolic Panel    3. Other vitamin B12 deficiency anemia    4. Anxiety    5. Primary insomnia    Other orders  -     Cancel: losartan (Cozaar) 25 MG tablet; Take 1 tablet by mouth Daily.  Dispense: 90 tablet; Refill: 1  -     simvastatin (ZOCOR) 10 MG tablet; Take 1 tablet by mouth Daily.  Dispense: 90 tablet; Refill: 3  -     escitalopram (LEXAPRO) 10 MG tablet; Take 1 tablet by mouth Daily.  Dispense: 90 tablet; Refill: 3  -     traZODone (DESYREL) 50 MG tablet; Take 0.5 tablets by mouth Every Night.  Dispense: 45 tablet; Refill: 3  -     amLODIPine (NORVASC) 5 MG tablet; Take 1 tablet by mouth Daily.  Dispense: 90 tablet; Refill: 3    Hyperlipidemia continue simvastatin  Hypertension continue amlodipine  Anemia continue B12 injection monthly  Anxiety depression continue citalopram  Patient's creatinine will be monitored since stopping lisinopril February 2021    Follow Up   Return in about 6 months (around 12/1/2021), or if symptoms worsen or fail to improve, for Recheck.  Patient was given instructions and counseling regarding his condition or for health maintenance advice. Please see specific information pulled into the AVS if appropriate.

## 2021-12-01 ENCOUNTER — OFFICE VISIT (OUTPATIENT)
Dept: INTERNAL MEDICINE | Facility: CLINIC | Age: 86
End: 2021-12-01

## 2021-12-01 ENCOUNTER — LAB (OUTPATIENT)
Dept: LAB | Facility: HOSPITAL | Age: 86
End: 2021-12-01

## 2021-12-01 ENCOUNTER — TELEPHONE (OUTPATIENT)
Dept: INTERNAL MEDICINE | Facility: CLINIC | Age: 86
End: 2021-12-01

## 2021-12-01 VITALS
OXYGEN SATURATION: 98 % | WEIGHT: 149 LBS | DIASTOLIC BLOOD PRESSURE: 66 MMHG | HEART RATE: 55 BPM | HEIGHT: 64 IN | SYSTOLIC BLOOD PRESSURE: 144 MMHG | BODY MASS INDEX: 25.44 KG/M2

## 2021-12-01 DIAGNOSIS — D51.8 OTHER VITAMIN B12 DEFICIENCY ANEMIA: ICD-10-CM

## 2021-12-01 DIAGNOSIS — F41.9 ANXIETY: ICD-10-CM

## 2021-12-01 DIAGNOSIS — F51.01 PRIMARY INSOMNIA: ICD-10-CM

## 2021-12-01 DIAGNOSIS — I10 ESSENTIAL HYPERTENSION: Primary | ICD-10-CM

## 2021-12-01 DIAGNOSIS — E78.2 MIXED HYPERLIPIDEMIA: ICD-10-CM

## 2021-12-01 DIAGNOSIS — D51.8 OTHER VITAMIN B12 DEFICIENCY ANEMIA: Primary | ICD-10-CM

## 2021-12-01 LAB
BASOPHILS # BLD AUTO: 0.04 10*3/MM3 (ref 0–0.2)
BASOPHILS NFR BLD AUTO: 0.6 % (ref 0–1.5)
CHOLEST SERPL-MCNC: 170 MG/DL (ref 0–200)
DEPRECATED RDW RBC AUTO: 42.3 FL (ref 37–54)
EOSINOPHIL # BLD AUTO: 0.07 10*3/MM3 (ref 0–0.4)
EOSINOPHIL NFR BLD AUTO: 1 % (ref 0.3–6.2)
ERYTHROCYTE [DISTWIDTH] IN BLOOD BY AUTOMATED COUNT: 12.7 % (ref 12.3–15.4)
HCT VFR BLD AUTO: 39.3 % (ref 37.5–51)
HDLC SERPL-MCNC: 57 MG/DL (ref 40–60)
HGB BLD-MCNC: 13.5 G/DL (ref 13–17.7)
IMM GRANULOCYTES # BLD AUTO: 0.02 10*3/MM3 (ref 0–0.05)
IMM GRANULOCYTES NFR BLD AUTO: 0.3 % (ref 0–0.5)
LDLC SERPL CALC-MCNC: 98 MG/DL (ref 0–100)
LDLC/HDLC SERPL: 1.69 {RATIO}
LYMPHOCYTES # BLD AUTO: 2.91 10*3/MM3 (ref 0.7–3.1)
LYMPHOCYTES NFR BLD AUTO: 41.7 % (ref 19.6–45.3)
MCH RBC QN AUTO: 31.5 PG (ref 26.6–33)
MCHC RBC AUTO-ENTMCNC: 34.4 G/DL (ref 31.5–35.7)
MCV RBC AUTO: 91.8 FL (ref 79–97)
MONOCYTES # BLD AUTO: 0.54 10*3/MM3 (ref 0.1–0.9)
MONOCYTES NFR BLD AUTO: 7.7 % (ref 5–12)
NEUTROPHILS NFR BLD AUTO: 3.4 10*3/MM3 (ref 1.7–7)
NEUTROPHILS NFR BLD AUTO: 48.7 % (ref 42.7–76)
NRBC BLD AUTO-RTO: 0 /100 WBC (ref 0–0.2)
PLATELET # BLD AUTO: 155 10*3/MM3 (ref 140–450)
PMV BLD AUTO: 11.5 FL (ref 6–12)
RBC # BLD AUTO: 4.28 10*6/MM3 (ref 4.14–5.8)
TRIGL SERPL-MCNC: 83 MG/DL (ref 0–150)
VLDLC SERPL-MCNC: 15 MG/DL (ref 5–40)
WBC NRBC COR # BLD: 6.98 10*3/MM3 (ref 3.4–10.8)

## 2021-12-01 PROCEDURE — 80061 LIPID PANEL: CPT | Performed by: FAMILY MEDICINE

## 2021-12-01 PROCEDURE — 36415 COLL VENOUS BLD VENIPUNCTURE: CPT | Performed by: FAMILY MEDICINE

## 2021-12-01 PROCEDURE — 85025 COMPLETE CBC W/AUTO DIFF WBC: CPT | Performed by: FAMILY MEDICINE

## 2021-12-01 PROCEDURE — 1160F RVW MEDS BY RX/DR IN RCRD: CPT | Performed by: FAMILY MEDICINE

## 2021-12-01 PROCEDURE — 1170F FXNL STATUS ASSESSED: CPT | Performed by: FAMILY MEDICINE

## 2021-12-01 PROCEDURE — 83921 ORGANIC ACID SINGLE QUANT: CPT | Performed by: FAMILY MEDICINE

## 2021-12-01 PROCEDURE — 99214 OFFICE O/P EST MOD 30 MIN: CPT | Performed by: FAMILY MEDICINE

## 2021-12-01 PROCEDURE — 1126F AMNT PAIN NOTED NONE PRSNT: CPT | Performed by: FAMILY MEDICINE

## 2021-12-01 PROCEDURE — G0439 PPPS, SUBSEQ VISIT: HCPCS | Performed by: FAMILY MEDICINE

## 2021-12-01 RX ORDER — SYRINGE WITH NEEDLE, 1 ML 28GX1/2"
1 SYRINGE, EMPTY DISPOSABLE MISCELLANEOUS
Qty: 12 EACH | Refills: 0 | Status: SHIPPED | OUTPATIENT
Start: 2021-12-01

## 2021-12-01 RX ORDER — CYANOCOBALAMIN 1000 UG/ML
1000 INJECTION, SOLUTION INTRAMUSCULAR; SUBCUTANEOUS
Qty: 3 ML | Refills: 3 | Status: SHIPPED | OUTPATIENT
Start: 2021-12-01 | End: 2022-12-01 | Stop reason: SDUPTHER

## 2021-12-01 NOTE — PROGRESS NOTES
"Chief Complaint  follow up to hyperlipidemia, follow up to hypertension, and Medicare Wellness-subsequent    Subjective          Praveen Torres presents to [facility]  History of Present Illness  Patient follows up for ongoing management of chronic medical problems of hyperlipidemia hypertension vitamin B deficiency anxiety and insomnia his blood pressures well controlled with amlodipine and he is taking the Lexapro for her anxiety without any unwanted side effects.  Sleep is controlled with trazodone he like to continue the same  He has no chest pain shortness of breath or increased fatigue no dizziness  We had discussion regarding his aspirin therapy and he is going to take it once or twice weekly with the meal he has no GI side effects  Objective   Vital Signs:   /66 (BP Location: Right arm, Patient Position: Sitting, Cuff Size: Adult)   Pulse 55   Ht 162.6 cm (64\")   Wt 67.6 kg (149 lb)   SpO2 98%   BMI 25.58 kg/m²     Physical Exam  Vitals and nursing note reviewed.   Constitutional:       Appearance: Normal appearance. He is well-developed. He is not diaphoretic.   HENT:      Head: Normocephalic and atraumatic.      Right Ear: Tympanic membrane, ear canal and external ear normal.      Left Ear: Tympanic membrane, ear canal and external ear normal.      Ears:      Comments: Hearing aid left  Eyes:      General: Lids are normal. No scleral icterus.     Extraocular Movements: Extraocular movements intact.      Conjunctiva/sclera: Conjunctivae normal.      Comments: Wears glasses   Neck:      Thyroid: No thyroid mass or thyromegaly.      Vascular: No carotid bruit or JVD.   Cardiovascular:      Rate and Rhythm: Normal rate and regular rhythm.      Pulses: Normal pulses.           Radial pulses are 2+ on the right side and 2+ on the left side.      Heart sounds: Normal heart sounds. No murmur heard.      Pulmonary:      Effort: Pulmonary effort is normal. No respiratory " distress.      Breath sounds: Normal breath sounds.   Abdominal:      Palpations: Abdomen is soft.      Tenderness: There is no right CVA tenderness or left CVA tenderness.   Musculoskeletal:      Cervical back: Normal range of motion.      Right lower leg: No edema.      Left lower leg: No edema.   Skin:     General: Skin is warm and dry.      Coloration: Skin is not pale.      Findings: No erythema or rash.   Neurological:      General: No focal deficit present.      Mental Status: He is alert and oriented to person, place, and time.      Sensory: No sensory deficit.      Deep Tendon Reflexes: Reflexes are normal and symmetric.   Psychiatric:         Mood and Affect: Mood normal.         Behavior: Behavior normal. Behavior is cooperative.         Thought Content: Thought content normal.         Judgment: Judgment normal.        Result Review :     Common labs    Common Labsle 12/29/20 6/1/21 12/1/21 12/1/21      1243 1243   Glucose 91 92     BUN 15 12     Creatinine 1.23 1.09     eGFR Non  Am 56 (A) 64     Sodium 141 140     Potassium 4.4 4.6     Chloride 103 101     Calcium 9.2 8.9     Albumin  4.30     Total Bilirubin  0.4     Alkaline Phosphatase  70     AST (SGOT)  19     ALT (SGPT)  16     WBC   6.98    Hemoglobin   13.5    Hematocrit   39.3    Platelets   155    Total Cholesterol    170   Triglycerides    83   HDL Cholesterol    57   LDL Cholesterol     98   (A) Abnormal value                      Assessment and Plan    Diagnoses and all orders for this visit:    1. Essential hypertension (Primary)    2. Mixed hyperlipidemia  -     Lipid Panel    3. Anxiety    4. Primary insomnia    5. Other vitamin B12 deficiency anemia  -     Methylmalonic Acid, Serum  -     CBC & Differential  -     cyanocobalamin 1000 MCG/ML injection; Inject 1 mL into the appropriate muscle as directed by prescriber Every 30 (Thirty) Days.  Dispense: 3 mL; Refill: 3    Hypertension continue amlodipine  Anxiety continue  citalopram  Hyperlipidemia continue simvastatin  Insomnia continue trazodone  Follow-up results of blood work otherwise as needed or    Follow Up   Return in about 6 months (around 6/1/2022), or if symptoms worsen or fail to improve, for Recheck.  Patient was given instructions and counseling regarding his condition or for health maintenance advice. Please see specific information pulled into the AVS if appropriate.

## 2021-12-01 NOTE — PROGRESS NOTES
The ABCs of the Annual Wellness Visit  Subsequent Medicare Wellness Visit    Chief Complaint   Patient presents with   • follow up to hyperlipidemia   • follow up to hypertension   • Medicare Wellness-subsequent      Subjective    History of Present Illness:  Praveen Torres is a 86 y.o. male who presents for a Subsequent Medicare Wellness Visit.    The following portions of the patient's history were reviewed and   updated as appropriate: allergies, current medications, past family history, past medical history, past social history, past surgical history and problem list.     Compared to one year ago, the patient feels his physical   health is the same.    Compared to one year ago, the patient feels his mental   health is the same.    Recent Hospitalizations:  He was not admitted to the hospital during the last year.       Current Medical Providers:  Patient Care Team:  Asa Grace MD as PCP - General (Family Medicine)  Chris Ibarra MD as Consulting Physician (Urology)  Roderick Chen MD as Consulting Physician (Vascular Surgery)    Outpatient Medications Prior to Visit   Medication Sig Dispense Refill   • acyclovir (ZOVIRAX) 5 % cream Apply 1 application topically every 3 (three) hours. 5 g 3   • amLODIPine (NORVASC) 5 MG tablet Take 1 tablet by mouth Daily. 90 tablet 3   • aspirin 81 MG chewable tablet Chew 81 mg Daily.     • coenzyme Q10 100 MG capsule Take 100 mg by mouth 2 (two) times a day.     • escitalopram (LEXAPRO) 10 MG tablet Take 1 tablet by mouth Daily. 90 tablet 3   • Multiple Vitamin (MULTI VITAMIN DAILY PO) Take 1 tablet by mouth Daily.     • simvastatin (ZOCOR) 10 MG tablet Take 1 tablet by mouth Daily. 90 tablet 3   • traZODone (DESYREL) 50 MG tablet Take 0.5 tablets by mouth Every Night. 45 tablet 3   • cyanocobalamin 1000 MCG/ML injection INJECT 1 ML INTO THE SHOULDER, THIGH OR BUTTOCK EVERY 28 DAYS 3 mL 3     No facility-administered medications prior to visit.       No  "opioid medication identified on active medication list. I have reviewed chart for other potential  high risk medication/s and harmful drug interactions in the elderly.          Aspirin is on active medication list. Aspirin use is indicated based on review of current medical condition/s. Pros and cons of this therapy have been discussed today. Benefits of this medication outweigh potential harm.  Patient has been encouraged to continue taking this medication.  .      Patient Active Problem List   Diagnosis   • Mixed hyperlipidemia   • Essential hypertension   • HSV infection   • Anxiety   • Anemia due to vitamin B12 deficiency   • Primary insomnia   • Medicare annual wellness visit, subsequent     Advance Care Planning   Advance Directive is not on file.  ACP discussion was held with the patient during this visit. Patient has an advance directive (not in EMR), copy requested.          Objective       Vitals:    12/01/21 1155   BP: 144/66   BP Location: Right arm   Patient Position: Sitting   Cuff Size: Adult   Pulse: 55   SpO2: 98%   Weight: 67.6 kg (149 lb)   Height: 162.6 cm (64\")   PainSc: 0-No pain     BMI Readings from Last 1 Encounters:   12/01/21 25.58 kg/m²   BMI is above normal parameters. Recommendations include: Stable    Does the patient have evidence of cognitive impairment? No    Physical Exam            HEALTH RISK ASSESSMENT    Smoking Status:  Social History     Tobacco Use   Smoking Status Never Smoker   Smokeless Tobacco Never Used     Alcohol Consumption:  Social History     Substance and Sexual Activity   Alcohol Use None     Fall Risk Screen:    LIBBY Fall Risk Assessment was completed, and patient is at LOW risk for falls.Assessment completed on:12/1/2021    Depression Screening:  PHQ-2/PHQ-9 Depression Screening 12/1/2021   Little interest or pleasure in doing things 0   Feeling down, depressed, or hopeless 0   Trouble falling or staying asleep, or sleeping too much 0   Feeling tired or " having little energy 0   Poor appetite or overeating 0   Feeling bad about yourself - or that you are a failure or have let yourself or your family down 0   Trouble concentrating on things, such as reading the newspaper or watching television 0   Moving or speaking so slowly that other people could have noticed. Or the opposite - being so fidgety or restless that you have been moving around a lot more than usual 0   Thoughts that you would be better off dead, or of hurting yourself in some way 0   Total Score 0   If you checked off any problems, how difficult have these problems made it for you to do your work, take care of things at home, or get along with other people? Not difficult at all       Health Habits and Functional and Cognitive Screening:  Functional & Cognitive Status 12/1/2021   Do you have difficulty preparing food and eating? No   Do you have difficulty bathing yourself, getting dressed or grooming yourself? No   Do you have difficulty using the toilet? No   Do you have difficulty moving around from place to place? No   Do you have trouble with steps or getting out of a bed or a chair? No   Current Diet Well Balanced Diet   Dental Exam Up to date   Eye Exam Up to date   Exercise (times per week) 3 times per week   Current Exercises Include Home Exercise Program (TV, Computer, Etc.)   Current Exercise Activities Include -   Do you need help using the phone?  No   Are you deaf or do you have serious difficulty hearing?  Yes   Do you need help with transportation? No   Do you need help shopping? No   Do you need help preparing meals?  No   Do you need help with housework?  No   Do you need help with laundry? No   Do you need help taking your medications? No   Do you need help managing money? No   Do you ever drive or ride in a car without wearing a seat belt? No   Have you felt unusual stress, anger or loneliness in the last month? No   Who do you live with? Spouse   If you need help, do you have  trouble finding someone available to you? No   Have you been bothered in the last four weeks by sexual problems? No   Do you have difficulty concentrating, remembering or making decisions? No       Age-appropriate Screening Schedule:  Refer to the list below for future screening recommendations based on patient's age, sex and/or medical conditions. Orders for these recommended tests are listed in the plan section. The patient has been provided with a written plan.    Health Maintenance   Topic Date Due   • LIPID PANEL  11/25/2021   • TDAP/TD VACCINES (2 - Td or Tdap) 11/08/2026   • INFLUENZA VACCINE  Completed   • ZOSTER VACCINE  Addressed              Assessment/Plan     CMS Preventative Services Quick Reference  Risk Factors Identified During Encounter  None Identified  The above risks/problems have been discussed with the patient.  Follow up actions/plans if indicated are seen below in the Assessment/Plan Section.  Pertinent information has been shared with the patient in the After Visit Summary.    Diagnoses and all orders for this visit:    1. Essential hypertension (Primary)    2. Mixed hyperlipidemia  -     Lipid Panel    3. Anxiety    4. Primary insomnia    5. Other vitamin B12 deficiency anemia  -     Methylmalonic Acid, Serum  -     CBC & Differential  -     cyanocobalamin 1000 MCG/ML injection; Inject 1 mL into the appropriate muscle as directed by prescriber Every 30 (Thirty) Days.  Dispense: 3 mL; Refill: 3        Follow Up:   Return in about 6 months (around 6/1/2022), or if symptoms worsen or fail to improve, for Recheck.     An After Visit Summary and PPPS were given to the patient.  Ongoing  management of chronic medical problems.

## 2021-12-01 NOTE — TELEPHONE ENCOUNTER
Caller: Praveen Torres    Relationship: Self    Best call back number: [phone]    What medication are you requesting: SURINGES    What are your current symptoms: B12 VITAMIN INJECTION    If a prescription is needed, what is your preferred pharmacy and phone number: [facility] [address] [place] [id number] [facility] [phone] [facility] [phone]      Additional notes:  PATIENT WAS ADVISED BY THE PHARMACY THAT HE NEEDS A PRESCRIPTION FOR THIS.

## 2021-12-07 LAB
Lab: NORMAL
METHYLMALONATE SERPL-SCNC: 207 NMOL/L (ref 0–378)

## 2022-01-27 RX ORDER — ESCITALOPRAM OXALATE 10 MG/1
TABLET ORAL
Qty: 90 TABLET | Refills: 1 | Status: SHIPPED | OUTPATIENT
Start: 2022-01-27 | End: 2022-08-22

## 2022-01-27 RX ORDER — TRAZODONE HYDROCHLORIDE 50 MG/1
TABLET ORAL
Qty: 45 TABLET | Refills: 1 | Status: SHIPPED | OUTPATIENT
Start: 2022-01-27 | End: 2022-06-01 | Stop reason: SDUPTHER

## 2022-01-27 RX ORDER — SIMVASTATIN 10 MG
TABLET ORAL
Qty: 90 TABLET | Refills: 1 | Status: SHIPPED | OUTPATIENT
Start: 2022-01-27 | End: 2022-12-01 | Stop reason: SDUPTHER

## 2022-02-22 RX ORDER — AMLODIPINE BESYLATE 5 MG/1
TABLET ORAL
Qty: 90 TABLET | Refills: 1 | Status: SHIPPED | OUTPATIENT
Start: 2022-02-22 | End: 2022-06-01 | Stop reason: SDUPTHER

## 2022-06-01 ENCOUNTER — OFFICE VISIT (OUTPATIENT)
Dept: INTERNAL MEDICINE | Facility: CLINIC | Age: 87
End: 2022-06-01

## 2022-06-01 VITALS
HEART RATE: 60 BPM | OXYGEN SATURATION: 98 % | DIASTOLIC BLOOD PRESSURE: 68 MMHG | SYSTOLIC BLOOD PRESSURE: 139 MMHG | WEIGHT: 146.6 LBS | HEIGHT: 64 IN | BODY MASS INDEX: 25.03 KG/M2

## 2022-06-01 DIAGNOSIS — I10 ESSENTIAL HYPERTENSION: ICD-10-CM

## 2022-06-01 DIAGNOSIS — F41.9 ANXIETY: ICD-10-CM

## 2022-06-01 DIAGNOSIS — D51.8 OTHER VITAMIN B12 DEFICIENCY ANEMIA: ICD-10-CM

## 2022-06-01 DIAGNOSIS — F51.01 PRIMARY INSOMNIA: ICD-10-CM

## 2022-06-01 DIAGNOSIS — E78.2 MIXED HYPERLIPIDEMIA: Primary | ICD-10-CM

## 2022-06-01 PROCEDURE — 99214 OFFICE O/P EST MOD 30 MIN: CPT | Performed by: FAMILY MEDICINE

## 2022-06-01 RX ORDER — AMLODIPINE BESYLATE 5 MG/1
5 TABLET ORAL DAILY
Qty: 90 TABLET | Refills: 1 | Status: SHIPPED | OUTPATIENT
Start: 2022-06-01 | End: 2022-12-01 | Stop reason: SDUPTHER

## 2022-06-01 RX ORDER — TRAZODONE HYDROCHLORIDE 50 MG/1
25 TABLET ORAL NIGHTLY
Qty: 45 TABLET | Refills: 1 | Status: SHIPPED | OUTPATIENT
Start: 2022-06-01 | End: 2022-12-01 | Stop reason: SDUPTHER

## 2022-06-01 NOTE — PROGRESS NOTES
"Chief Complaint  6 Month Follow Up To Hypertension    Subjective        Praveen Torres presents to [facility]  History of Present Illness  Patient follows up for ongoing management chronic problems of hypertension hyperlipidemia anxiety insomnia B12 deficiency  Blood pressures well controlled no chest pain shortness of breath or increased fatigue he feels little bit less tired with B12 supplementation injection  Stable stable sleep is  stable  Objective   Vital Signs:  /68 (BP Location: Left arm, Patient Position: Sitting, Cuff Size: Adult)   Pulse 60   Ht 162.6 cm (64\")   Wt 66.5 kg (146 lb 9.6 oz)   SpO2 98%   BMI 25.16 kg/m²           Physical Exam  Vitals and nursing note reviewed.   Constitutional:       Appearance: Normal appearance.   HENT:      Head: Normocephalic and atraumatic.   Neck:      Vascular: No carotid bruit.   Cardiovascular:      Rate and Rhythm: Normal rate and regular rhythm.   Pulmonary:      Effort: Pulmonary effort is normal.      Breath sounds: Normal breath sounds.   Abdominal:      Tenderness: There is no right CVA tenderness or left CVA tenderness.   Musculoskeletal:      Right lower leg: No edema.      Left lower leg: No edema.   Neurological:      Mental Status: He is alert.   Psychiatric:         Mood and Affect: Mood normal.         Behavior: Behavior normal.         Thought Content: Thought content normal.         Judgment: Judgment normal.        Result Review :    Common labs    Common Labsle 12/1/21 12/1/21    1243 1243   WBC 6.98    Hemoglobin 13.5    Hematocrit 39.3    Platelets 155    Total Cholesterol  170   Triglycerides  83   HDL Cholesterol  57   LDL Cholesterol   98                     Assessment and Plan   Diagnoses and all orders for this visit:    1. Mixed hyperlipidemia (Primary)  -     Lipid Panel    2. Essential hypertension  -     Comprehensive Metabolic Panel  -     amLODIPine (NORVASC) 5 MG tablet; Take 1 tablet by mouth " Daily.  Dispense: 90 tablet; Refill: 1    3. Other vitamin B12 deficiency anemia  -     Methylmalonic Acid, Serum    4. Primary insomnia  -     traZODone (DESYREL) 50 MG tablet; Take 0.5 tablets by mouth Every Night.  Dispense: 45 tablet; Refill: 1    5. Anxiety    Anxiety continue with escitalopram  Up results of blood work otherwise as needed or         Follow Up   Return in about 6 months (around 12/1/2022), or if symptoms worsen or fail to improve, for Recheck.  Patient was given instructions and counseling regarding his condition or for health maintenance advice. Please see specific information pulled into the AVS if appropriate.

## 2022-06-02 ENCOUNTER — LAB (OUTPATIENT)
Dept: LAB | Facility: HOSPITAL | Age: 87
End: 2022-06-02

## 2022-06-02 LAB
ALBUMIN SERPL-MCNC: 4.2 G/DL (ref 3.5–5.2)
ALBUMIN/GLOB SERPL: 2.5 G/DL
ALP SERPL-CCNC: 65 U/L (ref 39–117)
ALT SERPL W P-5'-P-CCNC: 15 U/L (ref 1–41)
ANION GAP SERPL CALCULATED.3IONS-SCNC: 6 MMOL/L (ref 5–15)
AST SERPL-CCNC: 14 U/L (ref 1–40)
BILIRUB SERPL-MCNC: 0.4 MG/DL (ref 0–1.2)
BUN SERPL-MCNC: 12 MG/DL (ref 8–23)
BUN/CREAT SERPL: 10.7 (ref 7–25)
CALCIUM SPEC-SCNC: 9 MG/DL (ref 8.6–10.5)
CHLORIDE SERPL-SCNC: 103 MMOL/L (ref 98–107)
CHOLEST SERPL-MCNC: 143 MG/DL (ref 0–200)
CO2 SERPL-SCNC: 31 MMOL/L (ref 22–29)
CREAT SERPL-MCNC: 1.12 MG/DL (ref 0.76–1.27)
EGFRCR SERPLBLD CKD-EPI 2021: 64 ML/MIN/1.73
GLOBULIN UR ELPH-MCNC: 1.7 GM/DL
GLUCOSE SERPL-MCNC: 97 MG/DL (ref 65–99)
HDLC SERPL-MCNC: 49 MG/DL (ref 40–60)
LDLC SERPL CALC-MCNC: 80 MG/DL (ref 0–100)
LDLC/HDLC SERPL: 1.64 {RATIO}
POTASSIUM SERPL-SCNC: 4.9 MMOL/L (ref 3.5–5.2)
PROT SERPL-MCNC: 5.9 G/DL (ref 6–8.5)
SODIUM SERPL-SCNC: 140 MMOL/L (ref 136–145)
TRIGL SERPL-MCNC: 68 MG/DL (ref 0–150)
VLDLC SERPL-MCNC: 14 MG/DL (ref 5–40)

## 2022-06-02 PROCEDURE — 80061 LIPID PANEL: CPT | Performed by: FAMILY MEDICINE

## 2022-06-02 PROCEDURE — 83921 ORGANIC ACID SINGLE QUANT: CPT | Performed by: FAMILY MEDICINE

## 2022-06-02 PROCEDURE — 36415 COLL VENOUS BLD VENIPUNCTURE: CPT | Performed by: FAMILY MEDICINE

## 2022-06-02 PROCEDURE — 80053 COMPREHEN METABOLIC PANEL: CPT | Performed by: FAMILY MEDICINE

## 2022-06-10 LAB — METHYLMALONATE SERPL-SCNC: 172 NMOL/L (ref 0–378)

## 2022-08-22 RX ORDER — ESCITALOPRAM OXALATE 10 MG/1
TABLET ORAL
Qty: 90 TABLET | Refills: 0 | Status: SHIPPED | OUTPATIENT
Start: 2022-08-22 | End: 2023-01-03

## 2022-12-01 ENCOUNTER — OFFICE VISIT (OUTPATIENT)
Dept: INTERNAL MEDICINE | Facility: CLINIC | Age: 87
End: 2022-12-01

## 2022-12-01 VITALS
HEART RATE: 57 BPM | BODY MASS INDEX: 25.62 KG/M2 | HEIGHT: 64 IN | DIASTOLIC BLOOD PRESSURE: 60 MMHG | WEIGHT: 150.1 LBS | OXYGEN SATURATION: 98 % | SYSTOLIC BLOOD PRESSURE: 168 MMHG

## 2022-12-01 DIAGNOSIS — F51.01 PRIMARY INSOMNIA: ICD-10-CM

## 2022-12-01 DIAGNOSIS — E78.2 MIXED HYPERLIPIDEMIA: ICD-10-CM

## 2022-12-01 DIAGNOSIS — D51.8 OTHER VITAMIN B12 DEFICIENCY ANEMIA: ICD-10-CM

## 2022-12-01 DIAGNOSIS — I10 ESSENTIAL HYPERTENSION: Primary | ICD-10-CM

## 2022-12-01 DIAGNOSIS — Z00.00 MEDICARE ANNUAL WELLNESS VISIT, SUBSEQUENT: ICD-10-CM

## 2022-12-01 PROCEDURE — 1159F MED LIST DOCD IN RCRD: CPT | Performed by: FAMILY MEDICINE

## 2022-12-01 PROCEDURE — 1170F FXNL STATUS ASSESSED: CPT | Performed by: FAMILY MEDICINE

## 2022-12-01 PROCEDURE — 99214 OFFICE O/P EST MOD 30 MIN: CPT | Performed by: FAMILY MEDICINE

## 2022-12-01 PROCEDURE — 1126F AMNT PAIN NOTED NONE PRSNT: CPT | Performed by: FAMILY MEDICINE

## 2022-12-01 PROCEDURE — G0439 PPPS, SUBSEQ VISIT: HCPCS | Performed by: FAMILY MEDICINE

## 2022-12-01 RX ORDER — CYANOCOBALAMIN 1000 UG/ML
1000 INJECTION, SOLUTION INTRAMUSCULAR; SUBCUTANEOUS
Qty: 3 ML | Refills: 3 | Status: SHIPPED | OUTPATIENT
Start: 2022-12-01

## 2022-12-01 RX ORDER — AMLODIPINE BESYLATE 5 MG/1
5 TABLET ORAL DAILY
Qty: 90 TABLET | Refills: 2 | Status: SHIPPED | OUTPATIENT
Start: 2022-12-01

## 2022-12-01 RX ORDER — TRAZODONE HYDROCHLORIDE 50 MG/1
25 TABLET ORAL NIGHTLY
Qty: 45 TABLET | Refills: 2 | Status: SHIPPED | OUTPATIENT
Start: 2022-12-01

## 2022-12-01 RX ORDER — LISINOPRIL 5 MG/1
5 TABLET ORAL DAILY
Qty: 90 TABLET | Refills: 2 | Status: SHIPPED | OUTPATIENT
Start: 2022-12-01

## 2022-12-01 RX ORDER — SIMVASTATIN 10 MG
10 TABLET ORAL DAILY
Qty: 90 TABLET | Refills: 2 | Status: SHIPPED | OUTPATIENT
Start: 2022-12-01

## 2022-12-01 RX ORDER — AMOXICILLIN 250 MG/1
CAPSULE ORAL
COMMUNITY
Start: 2022-11-29

## 2022-12-01 NOTE — PROGRESS NOTES
"Chief Complaint  FOLLOW UP TO HYPERLIPIDEMIA, FOLLOW UP TO HYPERTENSION, and Medicare Wellness-subsequent    Subjective        Praveen Torres presents to [facility]  History of Present Illness  Patient appointment for elevated blood pressure readings no chest pain shortness of breath or increased fatigue no unwanted side effects of medication no headaches.  Patient has been stable with statin therapy as well as therapeutic with B12 supplementation  Objective   Vital Signs:  /60 (BP Location: Right arm, Patient Position: Sitting, Cuff Size: Adult)   Pulse 57   Ht 162.6 cm (64\")   Wt 68.1 kg (150 lb 1.6 oz)   SpO2 98%   BMI 25.76 kg/m²   Estimated body mass index is 25.76 kg/m² as calculated from the following:    Height as of this encounter: 162.6 cm (64\").    Weight as of this encounter: 68.1 kg (150 lb 1.6 oz).          Physical Exam  Vitals and nursing note reviewed.   Constitutional:       Appearance: Normal appearance.   HENT:      Head: Normocephalic and atraumatic.   Cardiovascular:      Rate and Rhythm: Normal rate and regular rhythm.      Pulses: Normal pulses.      Heart sounds: Normal heart sounds.   Pulmonary:      Effort: Pulmonary effort is normal.      Breath sounds: Normal breath sounds.   Musculoskeletal:      Right lower leg: No edema.      Left lower leg: No edema.   Neurological:      Mental Status: He is alert.   Psychiatric:         Mood and Affect: Mood normal.         Behavior: Behavior normal.         Thought Content: Thought content normal.         Judgment: Judgment normal.        Result Review :    Common labs    Common Labs 6/2/22 6/2/22    0843 0843   Glucose  97   BUN  12   Creatinine  1.12   Sodium  140   Potassium  4.9   Chloride  103   Calcium  9.0   Albumin  4.20   Total Bilirubin  0.4   Alkaline Phosphatase  65   AST (SGOT)  14   ALT (SGPT)  15   Total Cholesterol 143    Triglycerides 68    HDL Cholesterol 49    LDL Cholesterol  80        "               Assessment and Plan   Diagnoses and all orders for this visit:    1. Essential hypertension (Primary)  -     amLODIPine (NORVASC) 5 MG tablet; Take 1 tablet by mouth Daily.  Dispense: 90 tablet; Refill: 2  -     lisinopril (PRINIVIL,ZESTRIL) 5 MG tablet; Take 1 tablet by mouth Daily.  Dispense: 90 tablet; Refill: 2    2. Mixed hyperlipidemia  -     simvastatin (ZOCOR) 10 MG tablet; Take 1 tablet by mouth Daily.  Dispense: 90 tablet; Refill: 2    3. Medicare annual wellness visit, subsequent    4. Other vitamin B12 deficiency anemia  -     cyanocobalamin 1000 MCG/ML injection; Inject 1 mL into the appropriate muscle as directed by prescriber Every 30 (Thirty) Days.  Dispense: 3 mL; Refill: 3    5. Primary insomnia  -     traZODone (DESYREL) 50 MG tablet; Take 0.5 tablets by mouth Every Night.  Dispense: 45 tablet; Refill: 2    Hypertension add lisinopril 5 mg daily monitor blood pressure goal less than 140/90           Follow Up   Return in about 3 months (around 3/1/2023), or if symptoms worsen or fail to improve, for Recheck.  Patient was given instructions and counseling regarding his condition or for health maintenance advice. Please see specific information pulled into the AVS if appropriate.

## 2022-12-01 NOTE — PROGRESS NOTES
"The ABCs of the Annual Wellness Visit  Subsequent Medicare Wellness Visit    Chief Complaint   Patient presents with   • FOLLOW UP TO HYPERLIPIDEMIA   • FOLLOW UP TO HYPERTENSION   • Medicare Wellness-subsequent      Subjective    History of Present Illness:  Praveen Torres is a 87 y.o. male who presents for a Subsequent Medicare Wellness Visit.    The following portions of the patient's history were reviewed and   updated as appropriate: allergies, current medications, past family history, past medical history, past social history, past surgical history and problem list.     Compared to one year ago, the patient feels his physical   health is the same.    Compared to one year ago, the patient feels his mental   health is the same.    Recent Hospitalizations:  He was not admitted to the hospital during the last year.       Current Medical Providers:  Patient Care Team:  Asa Grace MD as PCP - General (Family Medicine)  Chris Ibarra MD as Consulting Physician (Urology)  Roderick Chen MD as Consulting Physician (Vascular Surgery)    Outpatient Medications Prior to Visit   Medication Sig Dispense Refill   • acyclovir (ZOVIRAX) 5 % cream Apply 1 application topically every 3 (three) hours. 5 g 3   • amoxicillin (AMOXIL) 250 MG capsule FOR DENTAL WORK     • aspirin 81 MG chewable tablet Chew 81 mg Daily.     • coenzyme Q10 100 MG capsule Take 100 mg by mouth 2 (two) times a day.     • escitalopram (LEXAPRO) 10 MG tablet TAKE 1 TABLET DAILY 90 tablet 0   • Multiple Vitamin (MULTI VITAMIN DAILY PO) Take 1 tablet by mouth Daily.     • Tuberculin-Allergy Syringes (B-D ALLERGY SYRINGE 1CC/28G) 28G X 1/2\" 1 ML misc 1 each Every 30 (Thirty) Days. 12 each 0   • amLODIPine (NORVASC) 5 MG tablet Take 1 tablet by mouth Daily. 90 tablet 1   • cyanocobalamin 1000 MCG/ML injection Inject 1 mL into the appropriate muscle as directed by prescriber Every 30 (Thirty) Days. 3 mL 3   • simvastatin (ZOCOR) 10 MG tablet " "TAKE 1 TABLET DAILY 90 tablet 1   • traZODone (DESYREL) 50 MG tablet Take 0.5 tablets by mouth Every Night. 45 tablet 1     No facility-administered medications prior to visit.       No opioid medication identified on active medication list. I have reviewed chart for other potential  high risk medication/s and harmful drug interactions in the elderly.          Aspirin is on active medication list. Aspirin use is not indicated based on review of current medical condition/s. Risk of harm outweighs potential benefits. Patient instructed to discontinue this medication.  .      Patient Active Problem List   Diagnosis   • Mixed hyperlipidemia   • Essential hypertension   • HSV infection   • Anxiety   • Anemia due to vitamin B12 deficiency   • Primary insomnia   • Medicare annual wellness visit, subsequent     Advance Care Planning   Advance Directive is not on file.  ACP discussion was held with the patient during this visit. Patient has an advance directive (not in EMR), copy requested.          Objective       Vitals:    12/01/22 1106   BP: 168/60   BP Location: Right arm   Patient Position: Sitting   Cuff Size: Adult   Pulse: 57   SpO2: 98%   Weight: 68.1 kg (150 lb 1.6 oz)   Height: 162.6 cm (64\")   PainSc: 0-No pain     BMI Readings from Last 1 Encounters:   12/01/22 25.76 kg/m²   BMI is above normal parameters. Recommendations include: none    Does the patient have evidence of cognitive impairment? No    Physical Exam            HEALTH RISK ASSESSMENT    Smoking Status:  Social History     Tobacco Use   Smoking Status Never   Smokeless Tobacco Never     Alcohol Consumption:  Social History     Substance and Sexual Activity   Alcohol Use None     Fall Risk Screen:    STEADI Fall Risk Assessment was completed, and patient is at LOW risk for falls.Assessment completed on:12/1/2022    Depression Screening:  PHQ-2/PHQ-9 Depression Screening 12/1/2022   Retired PHQ-9 Total Score -   Retired Total Score -   Little Interest " or Pleasure in Doing Things 0-->not at all   Feeling Down, Depressed or Hopeless 0-->not at all   PHQ-9: Brief Depression Severity Measure Score 0       Health Habits and Functional and Cognitive Screening:  Functional & Cognitive Status 12/1/2022   Do you have difficulty preparing food and eating? No   Do you have difficulty bathing yourself, getting dressed or grooming yourself? No   Do you have difficulty using the toilet? No   Do you have difficulty moving around from place to place? No   Do you have trouble with steps or getting out of a bed or a chair? No   Current Diet Well Balanced Diet   Dental Exam Up to date   Eye Exam Up to date   Exercise (times per week) 7 times per week   Current Exercises Include Home Exercise Program (TV, Computer, Etc.);Walking   Current Exercise Activities Include -   Do you need help using the phone?  No   Are you deaf or do you have serious difficulty hearing?  Yes   Do you need help with transportation? No   Do you need help shopping? No   Do you need help preparing meals?  No   Do you need help with housework?  No   Do you need help with laundry? No   Do you need help taking your medications? No   Do you need help managing money? No   Do you ever drive or ride in a car without wearing a seat belt? No   Have you felt unusual stress, anger or loneliness in the last month? No   Who do you live with? Spouse   If you need help, do you have trouble finding someone available to you? No   Have you been bothered in the last four weeks by sexual problems? No   Do you have difficulty concentrating, remembering or making decisions? No       Age-appropriate Screening Schedule:  Refer to the list below for future screening recommendations based on patient's age, sex and/or medical conditions. Orders for these recommended tests are listed in the plan section. The patient has been provided with a written plan.    Health Maintenance   Topic Date Due   • LIPID PANEL  06/02/2023   • TDAP/TD  VACCINES (2 - Td or Tdap) 11/08/2026   • INFLUENZA VACCINE  Completed   • ZOSTER VACCINE  Addressed              Assessment & Plan     CMS Preventative Services Quick Reference  Risk Factors Identified During Encounter  Cardiovascular Disease  The above risks/problems have been discussed with the patient.  Follow up actions/plans if indicated are seen below in the Assessment/Plan Section.  Pertinent information has been shared with the patient in the After Visit Summary.    Diagnoses and all orders for this visit:    1. Essential hypertension (Primary)  -     amLODIPine (NORVASC) 5 MG tablet; Take 1 tablet by mouth Daily.  Dispense: 90 tablet; Refill: 2  -     lisinopril (PRINIVIL,ZESTRIL) 5 MG tablet; Take 1 tablet by mouth Daily.  Dispense: 90 tablet; Refill: 2    2. Mixed hyperlipidemia  -     simvastatin (ZOCOR) 10 MG tablet; Take 1 tablet by mouth Daily.  Dispense: 90 tablet; Refill: 2    3. Medicare annual wellness visit, subsequent    4. Other vitamin B12 deficiency anemia  -     cyanocobalamin 1000 MCG/ML injection; Inject 1 mL into the appropriate muscle as directed by prescriber Every 30 (Thirty) Days.  Dispense: 3 mL; Refill: 3    5. Primary insomnia  -     traZODone (DESYREL) 50 MG tablet; Take 0.5 tablets by mouth Every Night.  Dispense: 45 tablet; Refill: 2        Follow Up:   Return in about 3 months (around 3/1/2023), or if symptoms worsen or fail to improve, for Recheck.     An After Visit Summary and PPPS were given to the patient.  Ongoing  management of chronic medical problems.

## 2023-01-03 RX ORDER — ESCITALOPRAM OXALATE 10 MG/1
TABLET ORAL
Qty: 90 TABLET | Refills: 1 | Status: SHIPPED | OUTPATIENT
Start: 2023-01-03

## 2023-03-01 ENCOUNTER — OFFICE VISIT (OUTPATIENT)
Dept: INTERNAL MEDICINE | Facility: CLINIC | Age: 88
End: 2023-03-01
Payer: MEDICARE

## 2023-03-01 VITALS
SYSTOLIC BLOOD PRESSURE: 174 MMHG | OXYGEN SATURATION: 98 % | BODY MASS INDEX: 24.59 KG/M2 | DIASTOLIC BLOOD PRESSURE: 56 MMHG | HEIGHT: 64 IN | HEART RATE: 52 BPM | WEIGHT: 144 LBS

## 2023-03-01 DIAGNOSIS — I10 ESSENTIAL HYPERTENSION: ICD-10-CM

## 2023-03-01 DIAGNOSIS — E78.2 MIXED HYPERLIPIDEMIA: Primary | ICD-10-CM

## 2023-03-01 PROCEDURE — 99214 OFFICE O/P EST MOD 30 MIN: CPT | Performed by: FAMILY MEDICINE

## 2023-03-01 RX ORDER — LISINOPRIL 10 MG/1
10 TABLET ORAL DAILY
Qty: 90 TABLET | Refills: 1 | Status: CANCELLED | OUTPATIENT
Start: 2023-03-01

## 2023-03-01 NOTE — PROGRESS NOTES
"Chief Complaint  follow up to hypertension    Subjective        Praveen Torres presents to [facility]  History of Present Illness  Patient follows up for ongoing management of hypertension hyperlipidemia no chest pain shortness of breath or increased fatigue no month side effect of medication Home blood pressure readings are less than 150/90  Objective   Vital Signs:  /56 (BP Location: Right arm, Patient Position: Sitting, Cuff Size: Adult)   Pulse 52   Ht 162.6 cm (64\")   Wt 65.3 kg (144 lb)   SpO2 98%   BMI 24.72 kg/m²   Estimated body mass index is 24.72 kg/m² as calculated from the following:    Height as of this encounter: 162.6 cm (64\").    Weight as of this encounter: 65.3 kg (144 lb).       BMI is within normal parameters. No other follow-up for BMI required.      Physical Exam  Vitals reviewed.   Constitutional:       Appearance: Normal appearance.   HENT:      Head: Normocephalic and atraumatic.   Cardiovascular:      Rate and Rhythm: Normal rate.      Pulses: Normal pulses.      Heart sounds: Normal heart sounds.   Pulmonary:      Effort: Pulmonary effort is normal.      Breath sounds: Normal breath sounds.   Abdominal:      Tenderness: There is no right CVA tenderness or left CVA tenderness.   Musculoskeletal:      Right lower leg: No edema.      Left lower leg: No edema.   Neurological:      Mental Status: He is alert.   Psychiatric:         Mood and Affect: Mood normal.         Behavior: Behavior normal.         Thought Content: Thought content normal.         Judgment: Judgment normal.        Result Review :    Common labs    Common Labs 6/2/22 6/2/22    0843 0843   Glucose  97   BUN  12   Creatinine  1.12   Sodium  140   Potassium  4.9   Chloride  103   Calcium  9.0   Albumin  4.20   Total Bilirubin  0.4   Alkaline Phosphatase  65   AST (SGOT)  14   ALT (SGPT)  15   Total Cholesterol 143    Triglycerides 68    HDL Cholesterol 49    LDL Cholesterol  80        "                  Assessment and Plan   Diagnoses and all orders for this visit:    1. Mixed hyperlipidemia (Primary)  -     Lipid Panel    2. Essential hypertension  -     Comprehensive Metabolic Panel    Elevation of blood pressure reading in office today continue home checking  Follow-up results of blood work for ongoing management of chronic problems otherwise as needed or         Follow Up   Return in about 6 months (around 9/1/2023), or if symptoms worsen or fail to improve, for Recheck.  Patient was given instructions and counseling regarding his condition or for health maintenance advice. Please see specific information pulled into the AVS if appropriate.

## 2023-03-02 LAB
ALBUMIN SERPL-MCNC: 4.6 G/DL (ref 3.5–5.2)
ALBUMIN/GLOB SERPL: 2.2 G/DL
ALP SERPL-CCNC: 75 U/L (ref 39–117)
ALT SERPL-CCNC: 16 U/L (ref 1–41)
AST SERPL-CCNC: 20 U/L (ref 1–40)
BILIRUB SERPL-MCNC: 0.5 MG/DL (ref 0–1.2)
BUN SERPL-MCNC: 13 MG/DL (ref 8–23)
BUN/CREAT SERPL: 10.2 (ref 7–25)
CALCIUM SERPL-MCNC: 9.7 MG/DL (ref 8.6–10.5)
CHLORIDE SERPL-SCNC: 101 MMOL/L (ref 98–107)
CHOLEST SERPL-MCNC: 161 MG/DL (ref 0–200)
CO2 SERPL-SCNC: 35.4 MMOL/L (ref 22–29)
CREAT SERPL-MCNC: 1.27 MG/DL (ref 0.76–1.27)
EGFRCR SERPLBLD CKD-EPI 2021: 55 ML/MIN/1.73
GLOBULIN SER CALC-MCNC: 2.1 GM/DL
GLUCOSE SERPL-MCNC: 101 MG/DL (ref 65–99)
HDLC SERPL-MCNC: 58 MG/DL (ref 40–60)
LDLC SERPL CALC-MCNC: 88 MG/DL (ref 0–100)
POTASSIUM SERPL-SCNC: 4.6 MMOL/L (ref 3.5–5.2)
PROT SERPL-MCNC: 6.7 G/DL (ref 6–8.5)
SODIUM SERPL-SCNC: 139 MMOL/L (ref 136–145)
TRIGL SERPL-MCNC: 78 MG/DL (ref 0–150)
VLDLC SERPL CALC-MCNC: 15 MG/DL (ref 5–40)

## 2023-06-15 RX ORDER — ESCITALOPRAM OXALATE 10 MG/1
TABLET ORAL
Qty: 90 TABLET | Refills: 0 | Status: SHIPPED | OUTPATIENT
Start: 2023-06-15

## 2023-06-15 NOTE — TELEPHONE ENCOUNTER
Rx Refill Note  Requested Prescriptions     Pending Prescriptions Disp Refills    escitalopram (LEXAPRO) 10 MG tablet [Pharmacy Med Name: ESCITALOPRAM TABS 10MG] 90 tablet 0     Sig: TAKE 1 TABLET DAILY      Last office visit with prescribing clinician: 3/1/2023   Last telemedicine visit with prescribing clinician: Visit date not found   Next office visit with prescribing clinician: 9/1/2023                         Would you like a call back once the refill request has been completed: [] Yes [] No    If the office needs to give you a call back, can they leave a voicemail: [] Yes [] No    Pam Solano MA  06/15/23, 15:33 EDT

## 2023-08-07 DIAGNOSIS — E78.2 MIXED HYPERLIPIDEMIA: ICD-10-CM

## 2023-08-07 RX ORDER — SIMVASTATIN 10 MG
TABLET ORAL
Qty: 90 TABLET | Refills: 3 | Status: SHIPPED | OUTPATIENT
Start: 2023-08-07

## 2023-08-28 DIAGNOSIS — F51.01 PRIMARY INSOMNIA: ICD-10-CM

## 2023-08-28 RX ORDER — TRAZODONE HYDROCHLORIDE 50 MG/1
TABLET ORAL
Qty: 45 TABLET | Refills: 3 | Status: SHIPPED | OUTPATIENT
Start: 2023-08-28

## 2023-09-01 ENCOUNTER — OFFICE VISIT (OUTPATIENT)
Dept: INTERNAL MEDICINE | Facility: CLINIC | Age: 88
End: 2023-09-01
Payer: MEDICARE

## 2023-09-01 VITALS
DIASTOLIC BLOOD PRESSURE: 54 MMHG | SYSTOLIC BLOOD PRESSURE: 146 MMHG | OXYGEN SATURATION: 99 % | BODY MASS INDEX: 24.62 KG/M2 | HEIGHT: 64 IN | WEIGHT: 144.2 LBS | TEMPERATURE: 97.3 F | HEART RATE: 61 BPM

## 2023-09-01 DIAGNOSIS — I10 ESSENTIAL HYPERTENSION: ICD-10-CM

## 2023-09-01 DIAGNOSIS — F51.01 PRIMARY INSOMNIA: ICD-10-CM

## 2023-09-01 DIAGNOSIS — D51.8 OTHER VITAMIN B12 DEFICIENCY ANEMIA: ICD-10-CM

## 2023-09-01 DIAGNOSIS — F41.9 ANXIETY: ICD-10-CM

## 2023-09-01 DIAGNOSIS — E78.2 MIXED HYPERLIPIDEMIA: Primary | ICD-10-CM

## 2023-09-01 PROCEDURE — 99214 OFFICE O/P EST MOD 30 MIN: CPT | Performed by: FAMILY MEDICINE

## 2023-09-01 NOTE — PROGRESS NOTES
"Chief Complaint  Hyperlipidemia and Hypertension (6mth follow up )    Subjective        Praveen Torres presents to [facility]  History of Present Illness  Patient follows up for ongoing management chronic problems hyperlipidemia hypertension anxiety B12 deficiency and anemia he feels fairly stable with present treatment plan no unwanted side effects of medication needs laboratory support for present treatment plan  Objective   Vital Signs:  /54   Pulse 61   Temp 97.3 øF (36.3 øC)   Ht 162.6 cm (64.02\")   Wt 65.4 kg (144 lb 3.2 oz)   SpO2 99%   BMI 24.74 kg/mý   Estimated body mass index is 24.74 kg/mý as calculated from the following:    Height as of this encounter: 162.6 cm (64.02\").    Weight as of this encounter: 65.4 kg (144 lb 3.2 oz).       BMI is within normal parameters. No other follow-up for BMI required.      Physical Exam  Vitals and nursing note reviewed.   Constitutional:       Appearance: Normal appearance. He is well-developed. He is not diaphoretic.   HENT:      Head: Normocephalic and atraumatic.      Right Ear: Tympanic membrane, ear canal and external ear normal.      Left Ear: Tympanic membrane, ear canal and external ear normal.   Eyes:      General: Lids are normal. No scleral icterus.     Extraocular Movements: Extraocular movements intact.      Conjunctiva/sclera: Conjunctivae normal.   Neck:      Thyroid: No thyroid mass or thyromegaly.      Vascular: No carotid bruit or JVD.   Cardiovascular:      Rate and Rhythm: Normal rate and regular rhythm.      Pulses: Normal pulses.           Radial pulses are 2+ on the right side and 2+ on the left side.      Heart sounds: Normal heart sounds. No murmur heard.  Pulmonary:      Effort: Pulmonary effort is normal. No respiratory distress.      Breath sounds: Normal breath sounds.   Abdominal:      Palpations: Abdomen is soft.   Musculoskeletal:      Cervical back: Normal range of motion.      Right lower " leg: No edema.      Left lower leg: No edema.   Skin:     General: Skin is warm and dry.      Coloration: Skin is not pale.      Findings: No erythema or rash.   Neurological:      General: No focal deficit present.      Mental Status: He is alert and oriented to person, place, and time.      Sensory: No sensory deficit.      Deep Tendon Reflexes: Reflexes are normal and symmetric.   Psychiatric:         Mood and Affect: Mood normal.         Behavior: Behavior normal. Behavior is cooperative.         Thought Content: Thought content normal.         Judgment: Judgment normal.      Result Review :    Common labs          3/1/2023    10:16   Common Labs   Glucose 101    BUN 13    Creatinine 1.27    Sodium 139    Potassium 4.6    Chloride 101    Calcium 9.7    Total Protein 6.7    Albumin 4.6    Total Bilirubin 0.5    Alkaline Phosphatase 75    AST (SGOT) 20    ALT (SGPT) 16    Total Cholesterol 161    Triglycerides 78    HDL Cholesterol 58    LDL Cholesterol  88                   Assessment and Plan   Diagnoses and all orders for this visit:    1. Mixed hyperlipidemia (Primary)    2. Essential hypertension  -     Comprehensive Metabolic Panel    3. Primary insomnia    4. Anxiety    5. Other vitamin B12 deficiency anemia  -     Vitamin B12    Insomnia continue trazodone  Lipidemia continue simvastatin   Anxiety continue citalopram    Follow Up   Return in about 6 months (around 3/1/2024), or if symptoms worsen or fail to improve, for Recheck.  Patient was given instructions and counseling regarding his condition or for health maintenance advice. Please see specific information pulled into the AVS if appropriate.

## 2023-09-12 RX ORDER — ESCITALOPRAM OXALATE 10 MG/1
TABLET ORAL
Qty: 90 TABLET | Refills: 3 | Status: SHIPPED | OUTPATIENT
Start: 2023-09-12

## 2023-09-29 DIAGNOSIS — I10 ESSENTIAL HYPERTENSION: ICD-10-CM

## 2023-09-29 RX ORDER — AMLODIPINE BESYLATE 5 MG/1
TABLET ORAL
Qty: 90 TABLET | Refills: 3 | Status: SHIPPED | OUTPATIENT
Start: 2023-09-29

## 2023-12-28 DIAGNOSIS — D51.8 OTHER VITAMIN B12 DEFICIENCY ANEMIA: ICD-10-CM

## 2023-12-28 RX ORDER — CYANOCOBALAMIN 1000 UG/ML
INJECTION, SOLUTION INTRAMUSCULAR; SUBCUTANEOUS
Qty: 3 ML | Refills: 3 | Status: SHIPPED | OUTPATIENT
Start: 2023-12-28

## 2024-01-15 DIAGNOSIS — I10 ESSENTIAL HYPERTENSION: ICD-10-CM

## 2024-01-15 RX ORDER — LISINOPRIL 5 MG/1
TABLET ORAL
Qty: 90 TABLET | Refills: 3 | Status: SHIPPED | OUTPATIENT
Start: 2024-01-15

## 2024-03-05 ENCOUNTER — OFFICE VISIT (OUTPATIENT)
Dept: INTERNAL MEDICINE | Facility: CLINIC | Age: 89
End: 2024-03-05
Payer: MEDICARE

## 2024-03-05 VITALS
HEART RATE: 53 BPM | WEIGHT: 144.1 LBS | DIASTOLIC BLOOD PRESSURE: 66 MMHG | TEMPERATURE: 98.5 F | BODY MASS INDEX: 24.6 KG/M2 | HEIGHT: 64 IN | SYSTOLIC BLOOD PRESSURE: 160 MMHG | OXYGEN SATURATION: 98 %

## 2024-03-05 DIAGNOSIS — F51.01 PRIMARY INSOMNIA: ICD-10-CM

## 2024-03-05 DIAGNOSIS — Z00.00 MEDICARE ANNUAL WELLNESS VISIT, SUBSEQUENT: ICD-10-CM

## 2024-03-05 DIAGNOSIS — I10 ESSENTIAL HYPERTENSION: Primary | ICD-10-CM

## 2024-03-05 DIAGNOSIS — E78.2 MIXED HYPERLIPIDEMIA: ICD-10-CM

## 2024-03-05 LAB
ALBUMIN SERPL-MCNC: 4.5 G/DL (ref 3.5–5.2)
ALBUMIN/GLOB SERPL: 2.4 G/DL
ALP SERPL-CCNC: 76 U/L (ref 39–117)
ALT SERPL-CCNC: 15 U/L (ref 1–41)
AST SERPL-CCNC: 19 U/L (ref 1–40)
BILIRUB SERPL-MCNC: 0.4 MG/DL (ref 0–1.2)
BUN SERPL-MCNC: 17 MG/DL (ref 8–23)
BUN/CREAT SERPL: 12.1 (ref 7–25)
CALCIUM SERPL-MCNC: 9.2 MG/DL (ref 8.6–10.5)
CHLORIDE SERPL-SCNC: 103 MMOL/L (ref 98–107)
CHOLEST SERPL-MCNC: 156 MG/DL (ref 0–200)
CO2 SERPL-SCNC: 29.7 MMOL/L (ref 22–29)
CREAT SERPL-MCNC: 1.4 MG/DL (ref 0.76–1.27)
EGFRCR SERPLBLD CKD-EPI 2021: 48.6 ML/MIN/1.73
GLOBULIN SER CALC-MCNC: 1.9 GM/DL
GLUCOSE SERPL-MCNC: 107 MG/DL (ref 65–99)
HDLC SERPL-MCNC: 53 MG/DL (ref 40–60)
LDLC SERPL CALC-MCNC: 85 MG/DL (ref 0–100)
POTASSIUM SERPL-SCNC: 4.5 MMOL/L (ref 3.5–5.2)
PROT SERPL-MCNC: 6.4 G/DL (ref 6–8.5)
SODIUM SERPL-SCNC: 142 MMOL/L (ref 136–145)
TRIGL SERPL-MCNC: 97 MG/DL (ref 0–150)
VLDLC SERPL CALC-MCNC: 18 MG/DL (ref 5–40)

## 2024-03-05 RX ORDER — LISINOPRIL 10 MG/1
10 TABLET ORAL DAILY
Qty: 90 TABLET | Refills: 3 | Status: SHIPPED | OUTPATIENT
Start: 2024-03-05

## 2024-03-05 RX ORDER — TRAZODONE HYDROCHLORIDE 50 MG/1
25 TABLET ORAL NIGHTLY
Qty: 45 TABLET | Refills: 3 | Status: SHIPPED | OUTPATIENT
Start: 2024-03-05

## 2024-03-05 RX ORDER — SIMVASTATIN 10 MG
10 TABLET ORAL DAILY
Qty: 90 TABLET | Refills: 3 | Status: SHIPPED | OUTPATIENT
Start: 2024-03-05

## 2024-03-05 NOTE — PROGRESS NOTES
"The ABCs of the Annual Wellness Visit  [place] to Medicare Visit    Subjective     Praveen Torres is a 88 y.o. male who presents for a  Welcome to Medicare Visit.    The following portions of the patient's history were reviewed and   updated as appropriate: allergies, current medications, past family history, past medical history, past social history, past surgical history, and problem list.     Compared to one year ago, the patient feels his physical   health is the same.    Compared to one year ago, the patient feels his mental   health is the same.    Recent Hospitalizations:  He was not admitted to the hospital during the last year.       Current Medical Providers:  Patient Care Team:  Asa Grace MD as PCP - General (Family Medicine)  Chris Ibarra MD as Consulting Physician (Urology)  Roderick Chen MD as Consulting Physician (Vascular Surgery)    Outpatient Medications Prior to Visit   Medication Sig Dispense Refill    amLODIPine (NORVASC) 5 MG tablet TAKE 1 TABLET DAILY 90 tablet 3    aspirin 81 MG chewable tablet Chew 1 tablet Daily.      coenzyme Q10 100 MG capsule Take 1 capsule by mouth 2 (Two) Times a Day.      cyanocobalamin 1000 MCG/ML injection INJECT 1 ML INTO THE APPROPRIATE MUSCLE EVERY 30 DAYS AS DIRECTED BY PRESCRIBER 3 mL 3    escitalopram (LEXAPRO) 10 MG tablet TAKE 1 TABLET DAILY 90 tablet 3    Multiple Vitamin (MULTI VITAMIN DAILY PO) Take 1 tablet by mouth Daily.      Tuberculin-Allergy Syringes (B-D ALLERGY SYRINGE 1CC/28G) 28G X 1/2\" 1 ML misc 1 each Every 30 (Thirty) Days. 12 each 0    lisinopril (PRINIVIL,ZESTRIL) 5 MG tablet TAKE 1 TABLET DAILY 90 tablet 3    simvastatin (ZOCOR) 10 MG tablet TAKE 1 TABLET DAILY 90 tablet 3    traZODone (DESYREL) 50 MG tablet TAKE ONE-HALF (1/2) TABLET EVERY NIGHT 45 tablet 3    acyclovir (ZOVIRAX) 5 % cream Apply 1 application topically every 3 (three) hours. (Patient not taking: Reported on 3/5/2024) 5 g 3    amoxicillin (AMOXIL) 250 " "MG capsule FOR DENTAL WORK (Patient not taking: Reported on 9/1/2023)       No facility-administered medications prior to visit.       No opioid medication identified on active medication list. I have reviewed chart for other potential  high risk medication/s and harmful drug interactions in the elderly.        Aspirin is on active medication list. Aspirin use is indicated based on review of current medical condition/s. Pros and cons of this therapy have been discussed today. Benefits of this medication outweigh potential harm.  Patient has been encouraged to continue taking this medication.  .      Patient Active Problem List   Diagnosis    Mixed hyperlipidemia    Essential hypertension    HSV infection    Anxiety    Anemia due to vitamin B12 deficiency    Primary insomnia    Medicare annual wellness visit, subsequent     Advance Care Planning   Advance Care Planning     Advance Directive is not on file.  ACP discussion was held with the patient during this visit. Patient has an advance directive (not in EMR), copy requested.       Objective   Vitals:    03/05/24 1028   BP: 160/66   Pulse: 53   Temp: 98.5 °F (36.9 °C)   SpO2: 98%   Weight: 65.4 kg (144 lb 1.6 oz)   Height: 162.6 cm (64.02\")     Estimated body mass index is 24.72 kg/m² as calculated from the following:    Height as of this encounter: 162.6 cm (64.02\").    Weight as of this encounter: 65.4 kg (144 lb 1.6 oz).    BMI is within normal parameters. No other follow-up for BMI required.      Does the patient have evidence of cognitive impairment?   No         Procedures       HEALTH RISK ASSESSMENT    Smoking Status:  Social History     Tobacco Use   Smoking Status Never   Smokeless Tobacco Never     Alcohol Consumption:  Social History     Substance and Sexual Activity   Alcohol Use Never       Fall Risk Screen:    STEADI Fall Risk Assessment was completed, and patient is at LOW risk for falls.Assessment completed on:3/5/2024    Depression Screen:       " 3/5/2024    10:35 AM   PHQ-2/PHQ-9 Depression Screening   Little Interest or Pleasure in Doing Things 0-->not at all   Feeling Down, Depressed or Hopeless 0-->not at all   PHQ-9: Brief Depression Severity Measure Score 0       Health Habits and Functional and Cognitive Screening:      3/5/2024    10:33 AM   Functional & Cognitive Status   Do you have difficulty preparing food and eating? No   Do you have difficulty bathing yourself, getting dressed or grooming yourself? No   Do you have difficulty using the toilet? No   Do you have difficulty moving around from place to place? No   Do you have trouble with steps or getting out of a bed or a chair? No   Current Diet Well Balanced Diet   Dental Exam Up to date   Eye Exam Up to date   Exercise (times per week) 3 times per week   Current Exercises Include Yard Work   Do you need help using the phone?  No   Are you deaf or do you have serious difficulty hearing?  Yes   Do you need help to go to places out of walking distance? No   Do you need help shopping? No   Do you need help preparing meals?  No   Do you need help with housework?  No   Do you need help with laundry? No   Do you need help taking your medications? No   Do you need help managing money? No   Do you ever drive or ride in a car without wearing a seat belt? No   Have you felt unusual stress, anger or loneliness in the last month? No   Who do you live with? Spouse   If you need help, do you have trouble finding someone available to you? No   Have you been bothered in the last four weeks by sexual problems? No   Do you have difficulty concentrating, remembering or making decisions? No       Visual Acuity:    No results found.    Age-appropriate Screening Schedule:  Refer to the list below for future screening recommendations based on patient's age, sex and/or medical conditions. Orders for these recommended tests are listed in the plan section. The patient has been provided with a written plan.    Health  Maintenance   Topic Date Due    RSV Vaccine - Adults (1 - 1-dose 60+ series) Never done    ANNUAL WELLNESS VISIT  12/01/2023    LIPID PANEL  03/01/2024    TDAP/TD VACCINES (2 - Td or Tdap) 11/08/2026    COVID-19 Vaccine  Completed    INFLUENZA VACCINE  Completed    ZOSTER VACCINE  Addressed    Pneumococcal Vaccine 65+  Discontinued        CMS Preventative Services Quick Reference  Risk Factors Identified During Encounter    Depression/Dysphoria: Current medication is effective, no change recommended  The above risks/problems have been discussed with the patient.  Pertinent information has been shared with the patient in the After Visit Summary.  Follow up plans and orders are seen below in the Assessment/Plan Section.    Diagnoses and all orders for this visit:    1. Essential hypertension (Primary)  -     Comprehensive Metabolic Panel  -     lisinopril (PRINIVIL,ZESTRIL) 10 MG tablet; Take 1 tablet by mouth Daily.  Dispense: 90 tablet; Refill: 3    2. Mixed hyperlipidemia  -     simvastatin (ZOCOR) 10 MG tablet; Take 1 tablet by mouth Daily.  Dispense: 90 tablet; Refill: 3  -     Lipid Panel    3. Primary insomnia  -     traZODone (DESYREL) 50 MG tablet; Take 0.5 tablets by mouth Every Night.  Dispense: 45 tablet; Refill: 3    4. Medicare annual wellness visit, subsequent        Follow Up:   Initial Medicare Visit in one year    An After Visit Summary and PPPS were made available to the patient.

## 2024-03-05 NOTE — PROGRESS NOTES
"Chief Complaint  Medicare Wellness-subsequent    Subjective        Praveen Torres presents to [facility]  History of Present Illness  Follows up for ongoing management of hypertension hyperlipidemia he has had some elevated blood pressure readings at home no chest pain shortness of breath or increased fatigue no unwanted side effects of medications presently taking amlodipine and lisinopril  Objective   Vital Signs:  /66   Pulse 53   Temp 98.5 °F (36.9 °C)   Ht 162.6 cm (64.02\")   Wt 65.4 kg (144 lb 1.6 oz)   SpO2 98%   BMI 24.72 kg/m²   Estimated body mass index is 24.72 kg/m² as calculated from the following:    Height as of this encounter: 162.6 cm (64.02\").    Weight as of this encounter: 65.4 kg (144 lb 1.6 oz).       BMI is within normal parameters. No other follow-up for BMI required.      Physical Exam  Vitals and nursing note reviewed.   Constitutional:       Appearance: Normal appearance. He is well-developed. He is not diaphoretic.   HENT:      Head: Normocephalic and atraumatic.      Right Ear: Tympanic membrane, ear canal and external ear normal.      Left Ear: Tympanic membrane, ear canal and external ear normal.   Eyes:      General: Lids are normal. No scleral icterus.     Extraocular Movements: Extraocular movements intact.      Conjunctiva/sclera: Conjunctivae normal.   Neck:      Thyroid: No thyroid mass or thyromegaly.      Vascular: No carotid bruit or JVD.   Cardiovascular:      Rate and Rhythm: Normal rate and regular rhythm.      Pulses: Normal pulses.           Radial pulses are 2+ on the right side and 2+ on the left side.      Heart sounds: Normal heart sounds. No murmur heard.  Pulmonary:      Effort: Pulmonary effort is normal. No respiratory distress.      Breath sounds: Normal breath sounds.   Abdominal:      Palpations: Abdomen is soft.      Tenderness: There is no right CVA tenderness or left CVA tenderness.   Musculoskeletal:      Cervical " back: Normal range of motion.      Right lower leg: No edema.      Left lower leg: No edema.   Skin:     General: Skin is warm and dry.      Coloration: Skin is not pale.      Findings: No erythema or rash.   Neurological:      General: No focal deficit present.      Mental Status: He is alert and oriented to person, place, and time.      Sensory: No sensory deficit.      Deep Tendon Reflexes: Reflexes are normal and symmetric.   Psychiatric:         Mood and Affect: Mood normal.         Behavior: Behavior normal. Behavior is cooperative.         Thought Content: Thought content normal.         Judgment: Judgment normal.        Result Review :      March '23 BUN 13 creatinine 1.27               Assessment and Plan     Diagnoses and all orders for this visit:    1. Essential hypertension (Primary)  -     Comprehensive Metabolic Panel  -     lisinopril (PRINIVIL,ZESTRIL) 10 MG tablet; Take 1 tablet by mouth Daily.  Dispense: 90 tablet; Refill: 3    2. Mixed hyperlipidemia  -     simvastatin (ZOCOR) 10 MG tablet; Take 1 tablet by mouth Daily.  Dispense: 90 tablet; Refill: 3  -     Lipid Panel    3. Primary insomnia  -     traZODone (DESYREL) 50 MG tablet; Take 0.5 tablets by mouth Every Night.  Dispense: 45 tablet; Refill: 3    4. Medicare annual wellness visit, subsequent    Hypertension increase lisinopril to 10 mg daily continue amlodipine 5 mg daily monitor blood pressure with goal less than 140/90         Follow Up     Return in about 1 month (around 4/5/2024), or if symptoms worsen or fail to improve, for Recheck.  Patient was given instructions and counseling regarding his condition or for health maintenance advice. Please see specific information pulled into the AVS if appropriate.

## 2024-07-31 DIAGNOSIS — F51.01 PRIMARY INSOMNIA: ICD-10-CM

## 2024-07-31 RX ORDER — TRAZODONE HYDROCHLORIDE 50 MG/1
25 TABLET ORAL NIGHTLY
Qty: 45 TABLET | Refills: 3 | OUTPATIENT
Start: 2024-07-31

## 2024-09-05 ENCOUNTER — OFFICE VISIT (OUTPATIENT)
Dept: INTERNAL MEDICINE | Facility: CLINIC | Age: 89
End: 2024-09-05
Payer: MEDICARE

## 2024-09-05 VITALS
TEMPERATURE: 97.6 F | SYSTOLIC BLOOD PRESSURE: 140 MMHG | OXYGEN SATURATION: 99 % | WEIGHT: 142.9 LBS | HEIGHT: 64 IN | RESPIRATION RATE: 16 BRPM | HEART RATE: 45 BPM | DIASTOLIC BLOOD PRESSURE: 72 MMHG | BODY MASS INDEX: 24.4 KG/M2

## 2024-09-05 DIAGNOSIS — I10 ESSENTIAL HYPERTENSION: Primary | ICD-10-CM

## 2024-09-05 DIAGNOSIS — E78.2 MIXED HYPERLIPIDEMIA: ICD-10-CM

## 2024-09-05 DIAGNOSIS — F41.9 ANXIETY: ICD-10-CM

## 2024-09-05 PROCEDURE — G2211 COMPLEX E/M VISIT ADD ON: HCPCS | Performed by: FAMILY MEDICINE

## 2024-09-05 PROCEDURE — 1159F MED LIST DOCD IN RCRD: CPT | Performed by: FAMILY MEDICINE

## 2024-09-05 PROCEDURE — 1160F RVW MEDS BY RX/DR IN RCRD: CPT | Performed by: FAMILY MEDICINE

## 2024-09-05 PROCEDURE — 99214 OFFICE O/P EST MOD 30 MIN: CPT | Performed by: FAMILY MEDICINE

## 2024-09-05 PROCEDURE — 1126F AMNT PAIN NOTED NONE PRSNT: CPT | Performed by: FAMILY MEDICINE

## 2024-09-05 NOTE — PROGRESS NOTES
"Chief Complaint  Follow-up and Hypertension (Taking meds and Checking BP at home regularly. SBP Consistent in the upper 130's)    Subjective        Praveen Torres presents to [facility]  History of Present Illness  Patient follows up for ongoing management of chronic problems of hypertension hyperlipidemia doing well with amlodipine 10 mg and simvastatin no unwanted side effects no chest pain shortness of breath or increased fatigue  Stable with Lexapro  Objective   Vital Signs:  /72 (BP Location: Right arm, Patient Position: Sitting, Cuff Size: Adult)   Pulse (!) 45   Temp 97.6 °F (36.4 °C) (Temporal)   Resp 16   Ht 162.6 cm (64.02\")   Wt 64.8 kg (142 lb 14.4 oz)   SpO2 99%   BMI 24.52 kg/m²   Estimated body mass index is 24.52 kg/m² as calculated from the following:    Height as of this encounter: 162.6 cm (64.02\").    Weight as of this encounter: 64.8 kg (142 lb 14.4 oz).    BMI is within normal parameters. No other follow-up for BMI required.      Physical Exam  Vitals and nursing note reviewed.   Constitutional:       Appearance: Normal appearance.   Cardiovascular:      Rate and Rhythm: Normal rate and regular rhythm.      Pulses: Normal pulses.      Heart sounds: Normal heart sounds.   Pulmonary:      Effort: Pulmonary effort is normal.      Breath sounds: Normal breath sounds.   Musculoskeletal:      Right lower leg: No edema.      Left lower leg: No edema.   Neurological:      Mental Status: He is alert.   Psychiatric:         Mood and Affect: Mood normal.         Behavior: Behavior normal.         Thought Content: Thought content normal.         Judgment: Judgment normal.        Result Review :    Common labs          3/5/2024    11:02   Common Labs   Glucose 107    BUN 17    Creatinine 1.40    Sodium 142    Potassium 4.5    Chloride 103    Calcium 9.2    Total Protein 6.4    Albumin 4.5    Total Bilirubin 0.4    Alkaline Phosphatase 76    AST (SGOT) 19    ALT " "(SGPT) 15    Total Cholesterol 156    Triglycerides 97    HDL Cholesterol 53    LDL Cholesterol  85                Assessment and Plan   Diagnoses and all orders for this visit:    1. Essential hypertension (Primary)  -     Comprehensive Metabolic Panel    2. Mixed hyperlipidemia  -     Comprehensive Metabolic Panel    3. Anxiety    Continue Lexapro for anxiety and depression  Continue monitoring blood pressure with a goal less than 140/90  Follow-up results of blood work otherwise as needed or   This patient has a PCP that is the continuing focal point for all health care services, and the patient sees this physician to be evaluated for hypertension. The inherent complexity that this code () captures is not in the clinical condition itself-- hypertension --but rather the cognitition of the continued responsibility of being the focal point for all needed services for this patient.\"     Follow Up   Return in about 6 months (around 3/5/2025), or if symptoms worsen or fail to improve, for Recheck, Annual physical, Medicare Wellness.  Patient was given instructions and counseling regarding his condition or for health maintenance advice. Please see specific information pulled into the AVS if appropriate.             "

## 2024-09-06 LAB
ALBUMIN SERPL-MCNC: 4.4 G/DL (ref 3.7–4.7)
ALP SERPL-CCNC: 72 IU/L (ref 44–121)
ALT SERPL-CCNC: 15 IU/L (ref 0–44)
AST SERPL-CCNC: 18 IU/L (ref 0–40)
BILIRUB SERPL-MCNC: 0.4 MG/DL (ref 0–1.2)
BUN SERPL-MCNC: 16 MG/DL (ref 8–27)
BUN/CREAT SERPL: 12 (ref 10–24)
CALCIUM SERPL-MCNC: 9.2 MG/DL (ref 8.6–10.2)
CHLORIDE SERPL-SCNC: 101 MMOL/L (ref 96–106)
CO2 SERPL-SCNC: 28 MMOL/L (ref 20–29)
CREAT SERPL-MCNC: 1.34 MG/DL (ref 0.76–1.27)
EGFRCR SERPLBLD CKD-EPI 2021: 51 ML/MIN/1.73
GLOBULIN SER CALC-MCNC: 2.2 G/DL (ref 1.5–4.5)
GLUCOSE SERPL-MCNC: 79 MG/DL (ref 70–99)
POTASSIUM SERPL-SCNC: 4.6 MMOL/L (ref 3.5–5.2)
PROT SERPL-MCNC: 6.6 G/DL (ref 6–8.5)
SODIUM SERPL-SCNC: 142 MMOL/L (ref 134–144)

## 2024-09-06 RX ORDER — ESCITALOPRAM OXALATE 10 MG/1
TABLET ORAL
Qty: 90 TABLET | Refills: 3 | Status: SHIPPED | OUTPATIENT
Start: 2024-09-06

## 2024-09-23 DIAGNOSIS — I10 ESSENTIAL HYPERTENSION: ICD-10-CM

## 2024-09-25 RX ORDER — AMLODIPINE BESYLATE 5 MG/1
TABLET ORAL
Qty: 90 TABLET | Refills: 3 | Status: SHIPPED | OUTPATIENT
Start: 2024-09-25

## 2024-12-02 ENCOUNTER — TELEPHONE (OUTPATIENT)
Dept: INTERNAL MEDICINE | Facility: CLINIC | Age: 89
End: 2024-12-02

## 2024-12-02 NOTE — TELEPHONE ENCOUNTER
Caller: Praveen Torres    Relationship: Self    Best call back number:     [phone] (Home)       Requested Prescriptions:     traZODone (DESYREL) 50 MG tablet          Pharmacy where request should be sent:  [facility] - [address] [phone] [facility] [phone] [facility]     Last office visit with prescribing clinician: 9/5/2024   Last telemedicine visit with prescribing clinician: Visit date not found   Next office visit with prescribing clinician: 3/5/2025     Additional details provided by patient: PATIENT CALLED TO REQUEST A MEDICATION REFILL ON MEDICATION. PATIENT HAS A 19 DAY SUPPLY LEFT.      Does the patient have less than a 3 day supply:  [] Yes  [x] No    Would you like a call back once the refill request has been completed: [] Yes [] No    If the office needs to give you a call back, can they leave a voicemail: [] Yes [] No    Carlitos Thomas Rep   12/02/24 15:45 EST         THANKS

## 2024-12-03 DIAGNOSIS — F51.01 PRIMARY INSOMNIA: ICD-10-CM

## 2024-12-03 RX ORDER — TRAZODONE HYDROCHLORIDE 50 MG/1
25 TABLET, FILM COATED ORAL NIGHTLY
Qty: 45 TABLET | Refills: 1 | Status: SHIPPED | OUTPATIENT
Start: 2024-12-03

## 2024-12-03 NOTE — TELEPHONE ENCOUNTER
Rx Refill Note  Requested Prescriptions     Pending Prescriptions Disp Refills    traZODone (DESYREL) 50 MG tablet 45 tablet 3     Sig: Take 0.5 tablets by mouth Every Night.      Last office visit with prescribing clinician: 9/5/2024   Last telemedicine visit with prescribing clinician: Visit date not found   Next office visit with prescribing clinician: 3/5/2025       {TIP  Please add Last Relevant Lab Date if appropriate: 03/05/24                 Would you like a call back once the refill request has been completed: [] Yes [] No    If the office needs to give you a call back, can they leave a voicemail: [] Yes [] No    Gin Joyner MA  12/03/24, 07:16 EST

## 2024-12-23 DIAGNOSIS — D51.8 OTHER VITAMIN B12 DEFICIENCY ANEMIA: ICD-10-CM

## 2024-12-23 RX ORDER — CYANOCOBALAMIN 1000 UG/ML
INJECTION, SOLUTION INTRAMUSCULAR; SUBCUTANEOUS
Qty: 3 ML | Refills: 3 | Status: SHIPPED | OUTPATIENT
Start: 2024-12-23

## 2025-01-14 DIAGNOSIS — I10 ESSENTIAL HYPERTENSION: ICD-10-CM

## 2025-01-14 RX ORDER — LISINOPRIL 10 MG/1
TABLET ORAL
Qty: 90 TABLET | Refills: 3 | Status: SHIPPED | OUTPATIENT
Start: 2025-01-14

## 2025-03-05 ENCOUNTER — OFFICE VISIT (OUTPATIENT)
Dept: INTERNAL MEDICINE | Facility: CLINIC | Age: 89
End: 2025-03-05
Payer: MEDICARE

## 2025-03-05 VITALS
SYSTOLIC BLOOD PRESSURE: 128 MMHG | TEMPERATURE: 97.9 F | WEIGHT: 145.6 LBS | HEART RATE: 51 BPM | HEIGHT: 64 IN | OXYGEN SATURATION: 97 % | DIASTOLIC BLOOD PRESSURE: 56 MMHG | BODY MASS INDEX: 24.86 KG/M2

## 2025-03-05 DIAGNOSIS — I10 ESSENTIAL HYPERTENSION: Primary | ICD-10-CM

## 2025-03-05 DIAGNOSIS — F41.9 ANXIETY: ICD-10-CM

## 2025-03-05 DIAGNOSIS — E78.2 MIXED HYPERLIPIDEMIA: ICD-10-CM

## 2025-03-05 DIAGNOSIS — F51.01 PRIMARY INSOMNIA: ICD-10-CM

## 2025-03-05 DIAGNOSIS — H61.21 IMPACTED CERUMEN OF RIGHT EAR: ICD-10-CM

## 2025-03-05 RX ORDER — TRAZODONE HYDROCHLORIDE 50 MG/1
25 TABLET ORAL NIGHTLY
Qty: 45 TABLET | Refills: 1 | Status: SHIPPED | OUTPATIENT
Start: 2025-03-05

## 2025-03-05 RX ORDER — ATORVASTATIN CALCIUM 10 MG/1
10 TABLET, FILM COATED ORAL DAILY
Qty: 90 TABLET | Refills: 2 | Status: SHIPPED | OUTPATIENT
Start: 2025-03-05

## 2025-03-05 NOTE — PROGRESS NOTES
" Subjective   The ABCs of the Annual Wellness Visit  Medicare Wellness Visit      Praveen Torres is a 89 y.o. patient who presents for a Medicare Wellness Visit.    The following portions of the patient's history were reviewed and   updated as appropriate: allergies, current medications, past family history, past medical history, past social history, past surgical history, and problem list.    Compared to one year ago, the patient's physical   health is the same.  Compared to one year ago, the patient's mental   health is the same.    Recent Hospitalizations:  He was not admitted to the hospital during the last year.     Current Medical Providers:  Patient Care Team:  Asa Grace MD as PCP - General (Family Medicine)  Chris Ibarra MD as Consulting Physician (Urology)  Roderick Chen MD as Consulting Physician (Vascular Surgery)    Outpatient Medications Prior to Visit   Medication Sig Dispense Refill    acyclovir (ZOVIRAX) 5 % cream Apply 1 application topically every 3 (three) hours. 5 g 3    amLODIPine (NORVASC) 5 MG tablet TAKE 1 TABLET DAILY 90 tablet 3    amoxicillin (AMOXIL) 250 MG capsule FOR DENTAL WORK      aspirin 81 MG chewable tablet Chew 1 tablet Daily.      coenzyme Q10 100 MG capsule Take 1 capsule by mouth 2 (Two) Times a Day.      cyanocobalamin 1000 MCG/ML injection INJECT 1 ML INTO THE APPROPRIATE MUSCLE EVERY 30 DAYS AS DIRECTED BY PRESCRIBER 3 mL 3    escitalopram (LEXAPRO) 10 MG tablet TAKE 1 TABLET DAILY 90 tablet 3    lisinopril (PRINIVIL,ZESTRIL) 10 MG tablet TAKE 1 TABLET DAILY (INCREASE DOSE) 90 tablet 3    Multiple Vitamin (MULTI VITAMIN DAILY PO) Take 1 tablet by mouth Daily.      Tuberculin-Allergy Syringes (B-D ALLERGY SYRINGE 1CC/28G) 28G X 1/2\" 1 ML misc 1 each Every 30 (Thirty) Days. 12 each 0    simvastatin (ZOCOR) 10 MG tablet Take 1 tablet by mouth Daily. 90 tablet 3    traZODone (DESYREL) 50 MG tablet Take 0.5 tablets by mouth Every Night. 45 tablet 1     No " "facility-administered medications prior to visit.     No opioid medication identified on active medication list. I have reviewed chart for other potential  high risk medication/s and harmful drug interactions in the elderly.      Aspirin is on active medication list. Aspirin use is indicated based on review of current medical condition/s. Pros and cons of this therapy have been discussed today. Benefits of this medication outweigh potential harm.  Patient has been encouraged to continue taking this medication.  .      Patient Active Problem List   Diagnosis    Mixed hyperlipidemia    Essential hypertension    HSV infection    Anxiety    Anemia due to vitamin B12 deficiency    Primary insomnia    Medicare annual wellness visit, subsequent    Impacted cerumen of right ear     Advance Care Planning Advance Directive is not on file.  ACP discussion was held with the patient during this visit. Patient has an advance directive (not in EMR), copy requested.            Objective   Vitals:    03/05/25 1116   BP: 128/56   Pulse: 51   Temp: 97.9 °F (36.6 °C)   SpO2: 97%   Weight: 66 kg (145 lb 9.6 oz)   Height: 162.6 cm (64\")   PainSc: 0-No pain       Estimated body mass index is 24.99 kg/m² as calculated from the following:    Height as of this encounter: 162.6 cm (64\").    Weight as of this encounter: 66 kg (145 lb 9.6 oz).    BMI is within normal parameters. No other follow-up for BMI required.           Does the patient have evidence of cognitive impairment? No                                                                                               Health  Risk Assessment    Smoking Status:  Social History     Tobacco Use   Smoking Status Never   Smokeless Tobacco Never     Alcohol Consumption:  Social History     Substance and Sexual Activity   Alcohol Use Never       Fall Risk Screen  STEADI Fall Risk Assessment was completed, and patient is at LOW risk for falls.Assessment completed on:3/5/2025    Depression " Screening   Little interest or pleasure in doing things? Not at all   Feeling down, depressed, or hopeless? Not at all   PHQ-2 Total Score 0      Health Habits and Functional and Cognitive Screening:      3/5/2024    10:33 AM   Functional & Cognitive Status   Do you have difficulty preparing food and eating? No   Do you have difficulty bathing yourself, getting dressed or grooming yourself? No   Do you have difficulty using the toilet? No   Do you have difficulty moving around from place to place? No   Do you have trouble with steps or getting out of a bed or a chair? No   Current Diet Well Balanced Diet   Dental Exam Up to date   Eye Exam Up to date   Exercise (times per week) 3 times per week   Current Exercises Include Yard Work   Do you need help using the phone?  No   Are you deaf or do you have serious difficulty hearing?  Yes   Do you need help to go to places out of walking distance? No   Do you need help shopping? No   Do you need help preparing meals?  No   Do you need help with housework?  No   Do you need help with laundry? No   Do you need help taking your medications? No   Do you need help managing money? No   Do you ever drive or ride in a car without wearing a seat belt? No   Have you felt unusual stress, anger or loneliness in the last month? No   Who do you live with? Spouse   If you need help, do you have trouble finding someone available to you? No   Have you been bothered in the last four weeks by sexual problems? No   Do you have difficulty concentrating, remembering or making decisions? No           Age-appropriate Screening Schedule:  Refer to the list below for future screening recommendations based on patient's age, sex and/or medical conditions. Orders for these recommended tests are listed in the plan section. The patient has been provided with a written plan.    Health Maintenance List  Health Maintenance   Topic Date Due    RSV Vaccine - Adults (1 - 1-dose 75+ series) Never done     LIPID PANEL  03/05/2025    ANNUAL WELLNESS VISIT  03/05/2026    TDAP/TD VACCINES (2 - Td or Tdap) 11/08/2026    COVID-19 Vaccine  Completed    INFLUENZA VACCINE  Completed    ZOSTER VACCINE  Addressed    Pneumococcal Vaccine 50+  Discontinued                                                                                                                                                CMS Preventative Services Quick Reference  Risk Factors Identified During Encounter  Hearing Problem:  Maintain hearing aid and cerumen removal    The above risks/problems have been discussed with the patient.  Pertinent information has been shared with the patient in the After Visit Summary.  An After Visit Summary and PPPS were made available to the patient.    Follow Up:   Next Medicare Wellness visit to be scheduled in 1 year.     Assessment & Plan  Essential hypertension      Orders:    Comprehensive Metabolic Panel    Anxiety         Mixed hyperlipidemia       Orders:    Lipid Panel    Primary insomnia    Orders:    traZODone (DESYREL) 50 MG tablet; Take 0.5 tablets by mouth Every Night.    Impacted cerumen of right ear              Follow Up:   Return in about 1 year (around 3/5/2026) for Medicare Wellness.

## 2025-03-05 NOTE — PROGRESS NOTES
"Chief Complaint  Hypertension    Subjective        Praveen Torres presents to [facility] PRIMARY [facility]ARE  History of Present Illness  Follow-up appointment for ongoing management of chronic problems of hypertension hyperlipidemia insomnia anxiety doing well he is taking amlodipine with simvastatin which is potentially conflicting will suggest changing the simvastatin to atorvastatin also complains of decreased hearing in his right ear compared to his left ear has aide in the left ear     Objective   Vital Signs:  /56   Pulse 51   Temp 97.9 °F (36.6 °C)   Ht 162.6 cm (64\")   Wt 66 kg (145 lb 9.6 oz)   SpO2 97%   BMI 24.99 kg/m²   Estimated body mass index is 24.99 kg/m² as calculated from the following:    Height as of this encounter: 162.6 cm (64\").    Weight as of this encounter: 66 kg (145 lb 9.6 oz).    BMI is within normal parameters. No other follow-up for BMI required.      Physical Exam  Vitals and nursing note reviewed.   Constitutional:       Appearance: Normal appearance. He is not ill-appearing.   HENT:      Right Ear: External ear normal. There is impacted cerumen.      Left Ear: Tympanic membrane, ear canal and external ear normal.      Nose: No congestion.      Mouth/Throat:      Pharynx: No posterior oropharyngeal erythema.   Eyes:      General: No scleral icterus.  Cardiovascular:      Rate and Rhythm: Normal rate and regular rhythm.      Pulses: Normal pulses.      Heart sounds: Normal heart sounds.   Pulmonary:      Effort: Pulmonary effort is normal.      Breath sounds: Normal breath sounds.   Abdominal:      Tenderness: There is no right CVA tenderness or left CVA tenderness.   Musculoskeletal:      Right lower leg: No edema.      Left lower leg: No edema.   Neurological:      Mental Status: He is alert.   Psychiatric:         Mood and Affect: Mood normal.         Behavior: Behavior normal.         Thought Content: Thought content normal.         Judgment: Judgment " "normal.        Result Review :    Common labs          9/5/2024    12:51   Common Labs   Glucose 79    BUN 16    Creatinine 1.34    Sodium 142    Potassium 4.6    Chloride 101    Calcium 9.2    Albumin 4.4    Total Bilirubin 0.4    Alkaline Phosphatase 72    AST (SGOT) 18    ALT (SGPT) 15                Assessment and Plan   Diagnoses and all orders for this visit:    1. Essential hypertension (Primary)  Assessment & Plan:             Orders:  -     Comprehensive Metabolic Panel    2. Anxiety  Assessment & Plan:             3. Mixed hyperlipidemia  Assessment & Plan:              Orders:  -     Lipid Panel    4. Primary insomnia  -     traZODone (DESYREL) 50 MG tablet; Take 0.5 tablets by mouth Every Night.  Dispense: 45 tablet; Refill: 1    5. Impacted cerumen of right ear    Other orders  -     atorvastatin (LIPITOR) 10 MG tablet; Take 1 tablet by mouth Daily.  Dispense: 90 tablet; Refill: 2    Discontinue simvastatin and start atorvastatin for lipid management  Cerumen impaction recommend OTC eardrops placed in right ear at bedtime flush with bulb syringe sitting in the morning  Monitor blood pressure goals less than 140/90     This patient has a PCP that is the continuing focal point for all health care services, and the patient sees this physician to be evaluated for cerumen. The inherent complexity that this code () captures is not in the clinical condition itself-- cerumen --but rather the cognitition of the continued responsibility of being the focal point for all needed services for this patient.\"     Follow Up   Return in about 6 months (around 9/5/2025), or if symptoms worsen or fail to improve, for Recheck.  Patient was given instructions and counseling regarding his condition or for health maintenance advice. Please see specific information pulled into the AVS if appropriate.             "

## 2025-03-06 LAB
ALBUMIN SERPL-MCNC: 4.4 G/DL (ref 3.7–4.7)
ALP SERPL-CCNC: 79 IU/L (ref 44–121)
ALT SERPL-CCNC: 13 IU/L (ref 0–44)
AST SERPL-CCNC: 20 IU/L (ref 0–40)
BILIRUB SERPL-MCNC: 0.4 MG/DL (ref 0–1.2)
BUN SERPL-MCNC: 16 MG/DL (ref 8–27)
BUN/CREAT SERPL: 12 (ref 10–24)
CALCIUM SERPL-MCNC: 9.1 MG/DL (ref 8.6–10.2)
CHLORIDE SERPL-SCNC: 101 MMOL/L (ref 96–106)
CHOLEST SERPL-MCNC: 159 MG/DL (ref 100–199)
CO2 SERPL-SCNC: 28 MMOL/L (ref 20–29)
CREAT SERPL-MCNC: 1.35 MG/DL (ref 0.76–1.27)
EGFRCR SERPLBLD CKD-EPI 2021: 50 ML/MIN/1.73
GLOBULIN SER CALC-MCNC: 2.3 G/DL (ref 1.5–4.5)
GLUCOSE SERPL-MCNC: 112 MG/DL (ref 70–99)
HDLC SERPL-MCNC: 50 MG/DL
LDLC SERPL CALC-MCNC: 91 MG/DL (ref 0–99)
POTASSIUM SERPL-SCNC: 4.6 MMOL/L (ref 3.5–5.2)
PROT SERPL-MCNC: 6.7 G/DL (ref 6–8.5)
SODIUM SERPL-SCNC: 142 MMOL/L (ref 134–144)
TRIGL SERPL-MCNC: 98 MG/DL (ref 0–149)
VLDLC SERPL CALC-MCNC: 18 MG/DL (ref 5–40)

## 2025-03-07 DIAGNOSIS — I10 ESSENTIAL HYPERTENSION: ICD-10-CM

## 2025-03-07 RX ORDER — LISINOPRIL 5 MG/1
5 TABLET ORAL DAILY
Qty: 90 TABLET | Refills: 1 | Status: SHIPPED | OUTPATIENT
Start: 2025-03-07

## 2025-03-31 DIAGNOSIS — I65.23 BILATERAL CAROTID ARTERY STENOSIS: Primary | ICD-10-CM

## 2025-05-09 ENCOUNTER — HOSPITAL ENCOUNTER (OUTPATIENT)
Facility: HOSPITAL | Age: 89
Discharge: HOME | End: 2025-05-09
Payer: MEDICARE

## 2025-05-09 ENCOUNTER — OFFICE VISIT (OUTPATIENT)
Age: 89
End: 2025-05-09
Payer: MEDICARE

## 2025-05-09 VITALS
HEIGHT: 64 IN | RESPIRATION RATE: 16 BRPM | BODY MASS INDEX: 25.16 KG/M2 | SYSTOLIC BLOOD PRESSURE: 172 MMHG | DIASTOLIC BLOOD PRESSURE: 72 MMHG

## 2025-05-09 DIAGNOSIS — I65.23 BILATERAL CAROTID ARTERY STENOSIS: Primary | ICD-10-CM

## 2025-05-09 DIAGNOSIS — I65.23 BILATERAL CAROTID ARTERY STENOSIS: ICD-10-CM

## 2025-05-09 LAB
BH CV XLRA MEAS LEFT CAROTID BULB EDV: -16.4 CM/SEC
BH CV XLRA MEAS LEFT CAROTID BULB PSV: -92.6 CM/SEC
BH CV XLRA MEAS LEFT DIST CCA EDV: 14.1 CM/SEC
BH CV XLRA MEAS LEFT DIST CCA PSV: 84.4 CM/SEC
BH CV XLRA MEAS LEFT DIST ICA EDV: -24.4 CM/SEC
BH CV XLRA MEAS LEFT DIST ICA PSV: -114.7 CM/SEC
BH CV XLRA MEAS LEFT ICA/CCA RATIO: 1.36
BH CV XLRA MEAS LEFT MID CCA EDV: 12.9 CM/SEC
BH CV XLRA MEAS LEFT MID CCA PSV: 90.9 CM/SEC
BH CV XLRA MEAS LEFT MID ICA EDV: 24.4 CM/SEC
BH CV XLRA MEAS LEFT MID ICA PSV: 108.4 CM/SEC
BH CV XLRA MEAS LEFT PROX CCA EDV: 14.1 CM/SEC
BH CV XLRA MEAS LEFT PROX CCA PSV: 89.7 CM/SEC
BH CV XLRA MEAS LEFT PROX ECA PSV: -105.5 CM/SEC
BH CV XLRA MEAS LEFT PROX ICA EDV: -22 CM/SEC
BH CV XLRA MEAS LEFT PROX ICA PSV: -115.5 CM/SEC
BH CV XLRA MEAS LEFT PROX SCLA PSV: 146.3 CM/SEC
BH CV XLRA MEAS LEFT VERTEBRAL A EDV: 16.4 CM/SEC
BH CV XLRA MEAS LEFT VERTEBRAL A PSV: 76.2 CM/SEC
BH CV XLRA MEAS RIGHT CAROTID BULB EDV: -18.9 CM/SEC
BH CV XLRA MEAS RIGHT CAROTID BULB PSV: -88.8 CM/SEC
BH CV XLRA MEAS RIGHT DIST CCA EDV: -11.7 CM/SEC
BH CV XLRA MEAS RIGHT DIST CCA PSV: -85 CM/SEC
BH CV XLRA MEAS RIGHT DIST ICA EDV: -33 CM/SEC
BH CV XLRA MEAS RIGHT DIST ICA PSV: -132.8 CM/SEC
BH CV XLRA MEAS RIGHT ICA/CCA RATIO: 1.56
BH CV XLRA MEAS RIGHT MID CCA EDV: -13.5 CM/SEC
BH CV XLRA MEAS RIGHT MID CCA PSV: -99.1 CM/SEC
BH CV XLRA MEAS RIGHT MID ICA EDV: -18.8 CM/SEC
BH CV XLRA MEAS RIGHT MID ICA PSV: -83.8 CM/SEC
BH CV XLRA MEAS RIGHT PROX CCA EDV: 14.1 CM/SEC
BH CV XLRA MEAS RIGHT PROX CCA PSV: 112 CM/SEC
BH CV XLRA MEAS RIGHT PROX ECA PSV: -119.4 CM/SEC
BH CV XLRA MEAS RIGHT PROX ICA EDV: -19.3 CM/SEC
BH CV XLRA MEAS RIGHT PROX ICA PSV: -100.8 CM/SEC
BH CV XLRA MEAS RIGHT PROX SCLA PSV: 162.4 CM/SEC
BH CV XLRA MEAS RIGHT VERTEBRAL A EDV: 10.6 CM/SEC
BH CV XLRA MEAS RIGHT VERTEBRAL A PSV: 53.8 CM/SEC
LEFT ARM BP: NORMAL MMHG
RIGHT ARM BP: NORMAL MMHG

## 2025-05-09 PROCEDURE — 93880 EXTRACRANIAL BILAT STUDY: CPT

## 2025-05-09 NOTE — PROGRESS NOTES
Chief Complaint  Carotid Artery Disease    Subjective        Praveen Torres presents to [facility]  HPI   Praveen Torres is a 89 y.o. male that has been followed in our office for carotid artery stenosis. He returns today in follow up along with a carotid duplex. He  reports he has been doing well without hospitalizations or surgeries. He denies any symptoms consistent with CVA, TIA, or amaurosis fugax.     Review of Systems   Constitutional:  Negative for fever.   Eyes:  Negative for visual disturbance.   Cardiovascular:  Negative for leg swelling.   Gastrointestinal:  Negative for abdominal pain.   Musculoskeletal:  Negative for back pain.   Skin:  Negative for color change, pallor and wound.   Neurological:  Negative for dizziness, facial asymmetry, speech difficulty and weakness.        Praveen Torres  reports that he has never smoked. He has never been exposed to tobacco smoke. He has never used smokeless tobacco..        Objective   Vital Signs:  Vitals:    05/09/25 1440   BP: 172/72   Resp: 16      Body mass index is 25.16 kg/m².   BMI is within normal parameters. No other follow-up for BMI required.       Physical Exam  Vitals reviewed.   Constitutional:       Appearance: Normal appearance.   HENT:      Head: Normocephalic.   Cardiovascular:      Rate and Rhythm: Normal rate and regular rhythm.      Pulses: Normal pulses.           Dorsalis pedis pulses are 3+ on the right side and 3+ on the left side.        Posterior tibial pulses are 3+ on the right side and 3+ on the left side.   Pulmonary:      Effort: Pulmonary effort is normal.   Skin:     General: Skin is warm.   Neurological:      General: No focal deficit present.      Mental Status: He is alert and oriented to person, place, and time.   Psychiatric:         Mood and Affect: Mood normal.          Result Review :      Previous carotid duplex: <50% stenosis bilaterally     Carotid duplex from today: Duplex Carotid  Ultrasound CAR (05/09/2025 14:27)                    Assessment and Plan     Diagnoses and all orders for this visit:    1. Bilateral carotid artery stenosis (Primary)             Patient present today for follow up of carotid artery stenosis. He is to continue his antiplatelet agent which is aspirin. He is on a statin for cholesterol control.  We discussed adequate blood pressure control. Due to his advanced age and minimal disease, I have recommended we stop screening him. He will be seen PRN.    Follow Up     No follow-ups on file.  Patient was given instructions and counseling regarding his condition or for health maintenance advice. Please see specific information pulled into the AVS if appropriate.     Margaret Kinney, ROXANNE

## 2025-06-09 ENCOUNTER — OFFICE VISIT (OUTPATIENT)
Dept: INTERNAL MEDICINE | Facility: CLINIC | Age: 89
End: 2025-06-09
Payer: MEDICARE

## 2025-06-09 VITALS
RESPIRATION RATE: 14 BRPM | TEMPERATURE: 98.1 F | WEIGHT: 140 LBS | DIASTOLIC BLOOD PRESSURE: 70 MMHG | SYSTOLIC BLOOD PRESSURE: 158 MMHG | BODY MASS INDEX: 23.9 KG/M2 | HEIGHT: 64 IN | OXYGEN SATURATION: 96 % | HEART RATE: 48 BPM

## 2025-06-09 DIAGNOSIS — F41.9 ANXIETY: ICD-10-CM

## 2025-06-09 DIAGNOSIS — E78.2 MIXED HYPERLIPIDEMIA: ICD-10-CM

## 2025-06-09 DIAGNOSIS — F51.01 PRIMARY INSOMNIA: ICD-10-CM

## 2025-06-09 DIAGNOSIS — I10 ESSENTIAL HYPERTENSION: Primary | ICD-10-CM

## 2025-06-09 PROCEDURE — 1126F AMNT PAIN NOTED NONE PRSNT: CPT | Performed by: FAMILY MEDICINE

## 2025-06-09 PROCEDURE — 99214 OFFICE O/P EST MOD 30 MIN: CPT | Performed by: FAMILY MEDICINE

## 2025-06-09 PROCEDURE — G2211 COMPLEX E/M VISIT ADD ON: HCPCS | Performed by: FAMILY MEDICINE

## 2025-06-09 NOTE — PROGRESS NOTES
"Chief Complaint  Hypertension  Hyperlipidemia insomnia  Subjective        Praveen Torres presents to [facility] PRIMARY [facility]  History of Present Illness  Patient follows up for ongoing management of chronic problems of hypertension hyperlipidemia insomnia  This visit cholesterol medication was changed from simvastatin to atorvastatin he does not want side effects  Blood pressure readings in the 130/8080 range  No chest pain shortness of breath or increased fatigue  Sleep is stable with trazodone half of the 25 mg pill  Mood and anxiety stable with the citalopram  Objective   Vital Signs:  /70 (BP Location: Left arm, Patient Position: Sitting)   Pulse (!) 48 Comment: pt states hr is always low 46-48  Temp 98.1 °F (36.7 °C) (Oral)   Resp 14   Ht 162 cm (63.78\")   Wt 63.5 kg (140 lb)   SpO2 96%   BMI 24.20 kg/m²   Estimated body mass index is 24.2 kg/m² as calculated from the following:    Height as of this encounter: 162 cm (63.78\").    Weight as of this encounter: 63.5 kg (140 lb).    BMI is within normal parameters. No other follow-up for BMI required.      Physical Exam  Vitals and nursing note reviewed.   Constitutional:       Appearance: Normal appearance.   HENT:      Head: Normocephalic and atraumatic.      Right Ear: Tympanic membrane, ear canal and external ear normal.      Left Ear: Tympanic membrane, ear canal and external ear normal.   Cardiovascular:      Rate and Rhythm: Normal rate and regular rhythm.      Pulses: Normal pulses.   Pulmonary:      Effort: Pulmonary effort is normal.      Breath sounds: Normal breath sounds.   Musculoskeletal:      Right lower leg: No edema.      Left lower leg: No edema.   Neurological:      Mental Status: He is alert.   Psychiatric:         Mood and Affect: Mood normal.         Behavior: Behavior normal.         Thought Content: Thought content normal.         Judgment: Judgment normal.        Result Review :    Common labs          " "9/5/2024    12:51 3/5/2025    11:46   Common Labs   Glucose 79  112    BUN 16  16    Creatinine 1.34  1.35    Sodium 142  142    Potassium 4.6  4.6    Chloride 101  101    Calcium 9.2  9.1    Albumin 4.4  4.4    Total Bilirubin 0.4  0.4    Alkaline Phosphatase 72  79    AST (SGOT) 18  20    ALT (SGPT) 15  13    Total Cholesterol  159    Triglycerides  98    HDL Cholesterol  50    LDL Cholesterol   91                Assessment and Plan   Diagnoses and all orders for this visit:    1. Essential hypertension (Primary)  -     Comprehensive Metabolic Panel    2. Mixed hyperlipidemia  -     Lipid Panel    3. Anxiety    4. Primary insomnia    Anxiety continue escitalopram  Insomnia trazodone 25 mg 1/2 pill at bedtime  Monitor blood pressure goals less than 140/90       This patient has a PCP that is the continuing focal point for all health care services, and the patient sees this physician to be evaluated for hypertension. The inherent complexity that this code () captures is not in the clinical condition itself-- hypertension --but rather the cognitition of the continued responsibility of being the focal point for all needed services for this patient.\"     Follow Up   Return in about 6 months (around 12/9/2025).  Patient was given instructions and counseling regarding his condition or for health maintenance advice. Please see specific information pulled into the AVS if appropriate.             "

## 2025-06-10 LAB
ALBUMIN SERPL-MCNC: 4.6 G/DL (ref 3.5–5.2)
ALBUMIN/GLOB SERPL: 2.2 G/DL
ALP SERPL-CCNC: 82 U/L (ref 39–117)
ALT SERPL-CCNC: 15 U/L (ref 1–41)
AST SERPL-CCNC: 20 U/L (ref 1–40)
BILIRUB SERPL-MCNC: 0.5 MG/DL (ref 0–1.2)
BUN SERPL-MCNC: 16 MG/DL (ref 8–23)
BUN/CREAT SERPL: 12.2 (ref 7–25)
CALCIUM SERPL-MCNC: 9.5 MG/DL (ref 8.6–10.5)
CHLORIDE SERPL-SCNC: 101 MMOL/L (ref 98–107)
CHOLEST SERPL-MCNC: 147 MG/DL (ref 0–200)
CO2 SERPL-SCNC: 30.2 MMOL/L (ref 22–29)
CREAT SERPL-MCNC: 1.31 MG/DL (ref 0.76–1.27)
EGFRCR SERPLBLD CKD-EPI 2021: 52 ML/MIN/1.73
GLOBULIN SER CALC-MCNC: 2.1 GM/DL
GLUCOSE SERPL-MCNC: 120 MG/DL (ref 65–99)
HDLC SERPL-MCNC: 54 MG/DL (ref 40–60)
LDLC SERPL CALC-MCNC: 79 MG/DL (ref 0–100)
POTASSIUM SERPL-SCNC: 4.8 MMOL/L (ref 3.5–5.2)
PROT SERPL-MCNC: 6.7 G/DL (ref 6–8.5)
SODIUM SERPL-SCNC: 142 MMOL/L (ref 136–145)
TRIGL SERPL-MCNC: 69 MG/DL (ref 0–150)
VLDLC SERPL CALC-MCNC: 14 MG/DL (ref 5–40)

## 2025-08-25 DIAGNOSIS — I10 ESSENTIAL HYPERTENSION: ICD-10-CM

## 2025-08-26 RX ORDER — LISINOPRIL 5 MG/1
5 TABLET ORAL DAILY
Qty: 90 TABLET | Refills: 1 | Status: SHIPPED | OUTPATIENT
Start: 2025-08-26